# Patient Record
Sex: MALE | Race: WHITE | NOT HISPANIC OR LATINO | Employment: UNEMPLOYED | URBAN - METROPOLITAN AREA
[De-identification: names, ages, dates, MRNs, and addresses within clinical notes are randomized per-mention and may not be internally consistent; named-entity substitution may affect disease eponyms.]

---

## 2018-05-10 ENCOUNTER — TELEPHONE (OUTPATIENT)
Dept: FAMILY MEDICINE CLINIC | Facility: CLINIC | Age: 38
End: 2018-05-10

## 2018-05-10 ENCOUNTER — OFFICE VISIT (OUTPATIENT)
Dept: FAMILY MEDICINE CLINIC | Facility: CLINIC | Age: 38
End: 2018-05-10
Payer: COMMERCIAL

## 2018-05-10 VITALS
WEIGHT: 196 LBS | HEIGHT: 73 IN | HEART RATE: 70 BPM | BODY MASS INDEX: 25.98 KG/M2 | TEMPERATURE: 98 F | DIASTOLIC BLOOD PRESSURE: 70 MMHG | SYSTOLIC BLOOD PRESSURE: 112 MMHG

## 2018-05-10 DIAGNOSIS — Z00.00 ANNUAL PHYSICAL EXAM: Primary | ICD-10-CM

## 2018-05-10 DIAGNOSIS — Z13.6 SCREENING FOR CARDIOVASCULAR, RESPIRATORY, AND GENITOURINARY DISEASES: ICD-10-CM

## 2018-05-10 DIAGNOSIS — D17.1 LIPOMA OF CHEST WALL: Primary | ICD-10-CM

## 2018-05-10 DIAGNOSIS — Z13.83 SCREENING FOR CARDIOVASCULAR, RESPIRATORY, AND GENITOURINARY DISEASES: ICD-10-CM

## 2018-05-10 DIAGNOSIS — Z13.89 SCREENING FOR CARDIOVASCULAR, RESPIRATORY, AND GENITOURINARY DISEASES: ICD-10-CM

## 2018-05-10 DIAGNOSIS — L30.9 DERMATITIS: ICD-10-CM

## 2018-05-10 PROBLEM — L92.0 GRANULOMA ANNULARE: Status: ACTIVE | Noted: 2018-05-10

## 2018-05-10 PROCEDURE — 99385 PREV VISIT NEW AGE 18-39: CPT | Performed by: FAMILY MEDICINE

## 2018-05-10 NOTE — PROGRESS NOTES
Assessment/Plan:     Diagnoses and all orders for this visit:    Annual physical exam  Comments:  Normal    Dermatitis  Comments:  Patient to contact old PCP and/or dermatologist to get results of biopsy  Further recommendations based on these results  Screening for cardiovascular, respiratory, and genitourinary diseases  -     Comprehensive metabolic panel; Future  -     Lipid panel; Future          Subjective:      Patient ID: Ankur Ellis is a 40 y o  male  Here to be established and for PE  - pt with no the ongoing complaints  Patient and wife moved from [place] about a year ago  He has a persistent rash for several years on his extremities and somewhat on his trunk  Patient had a biopsy 3 years ago and was given the diagnosis  Clobetasol was prescribed  Patient states that the rash improved somewhat but did not resolve with this therapy  He has not been on anything about a year  It is occasionally pruritic but no other symptoms are noted   -patient also has a history of mild asthma as a child  He states that he has not had an issue since his childhood  Patient states that he also had allergy issues that seem to have improved as well   -patient denies any cardiovascular, respiratory, GI or  complaints  There are also no extremity complaints  -reviewed past medical, past surgical, family and social history is with patient  He is not on medications at present  The following portions of the patient's history were reviewed and updated as appropriate:   He  has a past medical history of Asthma and Multiple lipomas  He There are no active problems to display for this patient  He  has no past surgical history on file  His family history includes Hypertension in his father; Prostate cancer in his father and paternal grandfather; Thyroid cancer in his mother  He  reports that he quit smoking about 9 years ago  He has a 4 50 pack-year smoking history   He quit smokeless tobacco use about 9 years ago  He reports that he drinks about 16 8 oz of alcohol per week   He reports that he does not use drugs  No current outpatient prescriptions on file  No current facility-administered medications for this visit  He has No Known Allergies       Review of Systems   Constitutional: Negative  HENT: Negative  Eyes: Negative  Respiratory: Negative  Cardiovascular: Negative  Gastrointestinal: Negative  Endocrine: Negative  Genitourinary: Negative  Musculoskeletal: Negative  Skin: Positive for rash  Negative for color change, pallor and wound  Allergic/Immunologic: Negative  Neurological: Negative  Hematological: Negative  Psychiatric/Behavioral: Negative  Objective:      /70   Pulse 70   Temp 98 °F (36 7 °C)   Ht 6' 1" (1 854 m)   Wt 88 9 kg (196 lb)   BMI 25 86 kg/m²          Physical Exam   Constitutional: He is oriented to person, place, and time  He appears well-developed and well-nourished  HENT:   Head: Normocephalic and atraumatic  Right Ear: External ear normal    Left Ear: External ear normal    Nose: Nose normal    Mouth/Throat: Oropharynx is clear and moist    Eyes: Conjunctivae and EOM are normal  Pupils are equal, round, and reactive to light  Neck: Normal range of motion  Neck supple  No thyromegaly present  Cardiovascular: Normal rate, regular rhythm, normal heart sounds and intact distal pulses  No murmur heard  Pulmonary/Chest: Effort normal and breath sounds normal    Abdominal: Soft  Bowel sounds are normal  He exhibits no distension and no mass  There is no tenderness  Musculoskeletal: Normal range of motion  He exhibits no edema or tenderness  Lymphadenopathy:     He has no cervical adenopathy  Neurological: He is alert and oriented to person, place, and time  No cranial nerve deficit  Coordination normal    Skin: Skin is warm   Rash (Diffuse, macular low papular eruption on upper and lower extremities and a few areas of the trunk  No excoriations, vesicles or other findings ) noted  Psychiatric: He has a normal mood and affect   His behavior is normal  Judgment and thought content normal

## 2018-05-10 NOTE — TELEPHONE ENCOUNTER
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

## 2018-06-13 ENCOUNTER — APPOINTMENT (OUTPATIENT)
Dept: LAB | Facility: CLINIC | Age: 38
End: 2018-06-13
Payer: COMMERCIAL

## 2018-06-13 DIAGNOSIS — Z13.83 SCREENING FOR CARDIOVASCULAR, RESPIRATORY, AND GENITOURINARY DISEASES: ICD-10-CM

## 2018-06-13 DIAGNOSIS — Z13.6 SCREENING FOR CARDIOVASCULAR, RESPIRATORY, AND GENITOURINARY DISEASES: ICD-10-CM

## 2018-06-13 DIAGNOSIS — Z13.89 SCREENING FOR CARDIOVASCULAR, RESPIRATORY, AND GENITOURINARY DISEASES: ICD-10-CM

## 2018-06-13 LAB
ALBUMIN SERPL BCP-MCNC: 3.8 G/DL (ref 3.5–5)
ALP SERPL-CCNC: 38 U/L (ref 46–116)
ALT SERPL W P-5'-P-CCNC: 24 U/L (ref 12–78)
ANION GAP SERPL CALCULATED.3IONS-SCNC: 5 MMOL/L (ref 4–13)
AST SERPL W P-5'-P-CCNC: 13 U/L (ref 5–45)
BILIRUB SERPL-MCNC: 1 MG/DL (ref 0.2–1)
BUN SERPL-MCNC: 17 MG/DL (ref 5–25)
CALCIUM SERPL-MCNC: 8.5 MG/DL (ref 8.3–10.1)
CHLORIDE SERPL-SCNC: 106 MMOL/L (ref 100–108)
CHOLEST SERPL-MCNC: 144 MG/DL (ref 50–200)
CO2 SERPL-SCNC: 26 MMOL/L (ref 21–32)
CREAT SERPL-MCNC: 0.76 MG/DL (ref 0.6–1.3)
GFR SERPL CREATININE-BSD FRML MDRD: 117 ML/MIN/1.73SQ M
GLUCOSE P FAST SERPL-MCNC: 95 MG/DL (ref 65–99)
HDLC SERPL-MCNC: 51 MG/DL (ref 40–60)
LDLC SERPL CALC-MCNC: 80 MG/DL (ref 0–100)
NONHDLC SERPL-MCNC: 93 MG/DL
POTASSIUM SERPL-SCNC: 4 MMOL/L (ref 3.5–5.3)
PROT SERPL-MCNC: 6.9 G/DL (ref 6.4–8.2)
SODIUM SERPL-SCNC: 137 MMOL/L (ref 136–145)
TRIGL SERPL-MCNC: 66 MG/DL

## 2018-06-13 PROCEDURE — 80061 LIPID PANEL: CPT

## 2018-06-13 PROCEDURE — 80053 COMPREHEN METABOLIC PANEL: CPT

## 2018-06-13 PROCEDURE — 36415 COLL VENOUS BLD VENIPUNCTURE: CPT

## 2019-01-29 DIAGNOSIS — J02.9 ACUTE PHARYNGITIS, UNSPECIFIED ETIOLOGY: Primary | ICD-10-CM

## 2019-01-29 RX ORDER — AMOXICILLIN 875 MG/1
875 TABLET, COATED ORAL 2 TIMES DAILY
Qty: 20 TABLET | Refills: 0 | Status: SHIPPED | OUTPATIENT
Start: 2019-01-29 | End: 2019-02-08

## 2019-02-20 ENCOUNTER — OFFICE VISIT (OUTPATIENT)
Dept: FAMILY MEDICINE CLINIC | Facility: CLINIC | Age: 39
End: 2019-02-20
Payer: COMMERCIAL

## 2019-02-20 VITALS
SYSTOLIC BLOOD PRESSURE: 132 MMHG | HEART RATE: 74 BPM | BODY MASS INDEX: 25.58 KG/M2 | WEIGHT: 193 LBS | HEIGHT: 73 IN | DIASTOLIC BLOOD PRESSURE: 82 MMHG | TEMPERATURE: 97.9 F

## 2019-02-20 DIAGNOSIS — J02.8 ACUTE PHARYNGITIS DUE TO OTHER SPECIFIED ORGANISMS: Primary | ICD-10-CM

## 2019-02-20 PROCEDURE — 99213 OFFICE O/P EST LOW 20 MIN: CPT | Performed by: NURSE PRACTITIONER

## 2019-02-20 PROCEDURE — 3008F BODY MASS INDEX DOCD: CPT | Performed by: NURSE PRACTITIONER

## 2019-02-20 RX ORDER — CEPHALEXIN 500 MG/1
500 CAPSULE ORAL 3 TIMES DAILY
Qty: 30 CAPSULE | Refills: 0 | Status: SHIPPED | OUTPATIENT
Start: 2019-02-20 | End: 2019-03-02

## 2019-02-20 NOTE — PROGRESS NOTES
Assessment/Plan:     Diagnoses and all orders for this visit:    Acute pharyngitis due to other specified organisms  -     cephalexin (KEFLEX) 500 mg capsule; Take 1 capsule (500 mg total) by mouth 3 (three) times a day for 10 days    Discussed with patient plan to treat with cephalexin 500 mg every eight hours for 10 days  Discussed with patient use of conservative measures: keep well hydrated, get plenty of rest, salt water gargles to easy discomfort and use of acetaminophen or NSAIDs for fever and pain control, if appropriate  Patient instructed to call if no improvement in 72 hours or symptoms worsen    Subjective:      Patient ID: Maria Isabel Jimenez is a 41 y o  male  41 y o male presenting with sore throat, post nasal drip and a productive cough for thick phelegm for the past week  He reports that the family had a cold at the end of January and were all treated with 10 days of amoxicillin  He was feeling better at the end of the antibiotic but within a few days the symptoms returned  He reports having low grade fever with chills and some muscle aches since the symptoms returned             Family History   Problem Relation Age of Onset    Thyroid cancer Mother     Prostate cancer Father     Hypertension Father     Prostate cancer Paternal Grandfather      Social History     Socioeconomic History    Marital status: /Civil Union     Spouse name: Not on file    Number of children: Not on file    Years of education: Not on file    Highest education level: Not on file   Occupational History    Not on file   Social Needs    Financial resource strain: Not on file    Food insecurity:     Worry: Not on file     Inability: Not on file    Transportation needs:     Medical: Not on file     Non-medical: Not on file   Tobacco Use    Smoking status: Former Smoker     Packs/day: 1 50     Years: 3 00     Pack years: 4 50     Last attempt to quit: 1/10/2009     Years since quitting: 10 1    Smokeless tobacco: Former User     Quit date: 1/10/2009   Substance and Sexual Activity    Alcohol use: Yes     Alcohol/week: 16 8 oz     Types: 28 Cans of beer per week    Drug use: No    Sexual activity: Yes     Partners: Female   Lifestyle    Physical activity:     Days per week: Not on file     Minutes per session: Not on file    Stress: Not on file   Relationships    Social connections:     Talks on phone: Not on file     Gets together: Not on file     Attends Buddhism service: Not on file     Active member of club or organization: Not on file     Attends meetings of clubs or organizations: Not on file     Relationship status: Not on file    Intimate partner violence:     Fear of current or ex partner: Not on file     Emotionally abused: Not on file     Physically abused: Not on file     Forced sexual activity: Not on file   Other Topics Concern    Not on file   Social History Narrative    1 child - son          Past Medical History:   Diagnosis Date    Asthma     mild as a child    Multiple lipomas      No past surgical history on file  No Known Allergies    Current Outpatient Medications:     cephalexin (KEFLEX) 500 mg capsule, Take 1 capsule (500 mg total) by mouth 3 (three) times a day for 10 days, Disp: 30 capsule, Rfl: 0      Review of Systems   Constitutional: Positive for chills, fatigue and fever  HENT: Positive for postnasal drip and sore throat  Eyes: Negative  Respiratory: Positive for cough ( thick greenish yellow)  Cardiovascular: Negative  Gastrointestinal: Negative  Musculoskeletal: Positive for myalgias  Neurological: Negative  Psychiatric/Behavioral: Negative  Objective:    /82 (BP Location: Right arm, Patient Position: Sitting, Cuff Size: Standard)   Pulse 74   Temp 97 9 °F (36 6 °C)   Ht 6' 1" (1 854 m)   Wt 87 5 kg (193 lb)   BMI 25 46 kg/m² (Reviewed)     Physical Exam   Constitutional: He is oriented to person, place, and time   Vital signs are normal  He appears well-developed and well-nourished  HENT:   Head: Normocephalic and atraumatic  Right Ear: Tympanic membrane, external ear and ear canal normal    Left Ear: Tympanic membrane, external ear and ear canal normal    Nose: Mucosal edema and rhinorrhea present  Mouth/Throat: Uvula is midline and mucous membranes are normal  Oropharyngeal exudate and posterior oropharyngeal erythema present  Tonsils are 1+ on the right  Tonsils are 1+ on the left  Eyes: Pupils are equal, round, and reactive to light  Conjunctivae and EOM are normal    Neck: Normal range of motion  Cardiovascular: Normal rate, regular rhythm and normal heart sounds  Pulmonary/Chest: Effort normal and breath sounds normal    Neurological: He is alert and oriented to person, place, and time  Skin: Skin is warm and dry  Psychiatric: He has a normal mood and affect   His behavior is normal

## 2019-10-09 ENCOUNTER — IMMUNIZATIONS (OUTPATIENT)
Dept: FAMILY MEDICINE CLINIC | Facility: CLINIC | Age: 39
End: 2019-10-09
Payer: COMMERCIAL

## 2019-10-09 DIAGNOSIS — Z23 NEED FOR INFLUENZA VACCINATION: Primary | ICD-10-CM

## 2019-10-09 PROCEDURE — 90471 IMMUNIZATION ADMIN: CPT | Performed by: FAMILY MEDICINE

## 2019-10-09 PROCEDURE — 90686 IIV4 VACC NO PRSV 0.5 ML IM: CPT | Performed by: FAMILY MEDICINE

## 2022-09-19 ENCOUNTER — OFFICE VISIT (OUTPATIENT)
Dept: FAMILY MEDICINE CLINIC | Facility: CLINIC | Age: 42
End: 2022-09-19
Payer: COMMERCIAL

## 2022-09-19 VITALS
SYSTOLIC BLOOD PRESSURE: 142 MMHG | BODY MASS INDEX: 25.55 KG/M2 | HEIGHT: 73 IN | OXYGEN SATURATION: 99 % | HEART RATE: 61 BPM | DIASTOLIC BLOOD PRESSURE: 98 MMHG | TEMPERATURE: 97.8 F | WEIGHT: 192.8 LBS

## 2022-09-19 DIAGNOSIS — Z80.42 FAMILY HISTORY OF PROSTATE CANCER: ICD-10-CM

## 2022-09-19 DIAGNOSIS — Z12.5 SCREENING FOR PROSTATE CANCER: ICD-10-CM

## 2022-09-19 DIAGNOSIS — N50.819 PAIN IN TESTICLE, UNSPECIFIED LATERALITY: ICD-10-CM

## 2022-09-19 DIAGNOSIS — Z00.00 ANNUAL PHYSICAL EXAM: Primary | ICD-10-CM

## 2022-09-19 PROCEDURE — 3725F SCREEN DEPRESSION PERFORMED: CPT | Performed by: NURSE PRACTITIONER

## 2022-09-19 PROCEDURE — 99396 PREV VISIT EST AGE 40-64: CPT | Performed by: NURSE PRACTITIONER

## 2022-09-19 RX ORDER — FLUTICASONE PROPIONATE 50 MCG
1 SPRAY, SUSPENSION (ML) NASAL DAILY
COMMUNITY

## 2022-09-19 RX ORDER — CETIRIZINE HYDROCHLORIDE 10 MG/1
10 TABLET ORAL DAILY
COMMUNITY

## 2022-09-19 RX ORDER — DIPHENOXYLATE HYDROCHLORIDE AND ATROPINE SULFATE 2.5; .025 MG/1; MG/1
TABLET ORAL DAILY
COMMUNITY

## 2022-09-19 NOTE — PATIENT INSTRUCTIONS

## 2022-09-19 NOTE — PROGRESS NOTES
ADULT ANNUAL [address]    NAME: Anuel Bustamante  AGE: 40 y o  SEX: male  : 1980     DATE: 2022     Assessment and Plan:     Problem List Items Addressed This Visit    None     Visit Diagnoses     Annual physical exam    -  Primary    Relevant Orders    Comprehensive metabolic panel    CBC and differential    Lipid panel    Pain in testicle, unspecified laterality        Relevant Orders    Ambulatory Referral to Urology    Screening for prostate cancer        Relevant Orders    PSA, Total Screen    Family history of prostate cancer        Relevant Orders    Ambulatory Referral to Urology        Immunizations and preventive care screenings were discussed with patient today  Appropriate education was printed on patient's after visit summary  Counseling:  Dental Health: discussed importance of regular tooth brushing, flossing, and dental visits  Exercise: the importance of regular exercise/physical activity was discussed  Recommend exercise 3-5 times per week for at least 30 minutes  BMI Counseling: Body mass index is 25 44 kg/m²  The BMI is above normal  Nutrition recommendations include decreasing portion sizes, encouraging healthy choices of fruits and vegetables, consuming healthier snacks, moderation in carbohydrate intake and increasing intake of lean protein  Exercise recommendations include exercising 3-5 times per week and strength training exercises  Rationale for BMI follow-up plan is due to patient being overweight or obese  Depression Screening and Follow-up Plan: Patient was screened for depression during today's encounter  They screened negative with a PHQ-2 score of 0  Return in about 1 year (around 2023)       Chief Complaint:     Chief Complaint   Patient presents with    Physical Exam    lumps on back    joint pain       History of Present Illness:     Adult Annual Physical   Patient here for a comprehensive physical exam  The patient reports that he hurt his back and pulled a groin muscle last week  He has been going to a chiropractor for the back and is starting to feel better but the groin intermittently hurts at times  He has a family of prostate cancer and was told years ago he had some plaque around one of his testicle  He has been having increased discomfort in his left testicle so would like a refer to urology to have it evaluated  Diet and Physical Activity  Diet/Nutrition: well balanced diet and consuming 3-5 servings of fruits/vegetables daily  Exercise: no formal exercise  Depression Screening  PHQ-2/9 Depression Screening    Little interest or pleasure in doing things: 0 - not at all  Feeling down, depressed, or hopeless: 0 - not at all  PHQ-2 Score: 0  PHQ-2 Interpretation: Negative depression screen       General Health  Sleep: sleeps well and gets 4-6 hours of sleep on average  Hearing: normal - bilateral   Vision: no vision problems  Dental: regular dental visits and brushes teeth twice daily   Health  Symptoms include: urinary frequency, urinary hesitancy, incomplete bladder emptying and urinary obstruction     Review of Systems:     Review of Systems   Constitutional: Negative for activity change, appetite change and unexpected weight change  HENT: Negative for dental problem, ear pain, hearing loss, nosebleeds, sneezing, sore throat, tinnitus and trouble swallowing  Eyes: Negative for visual disturbance  Respiratory: Negative for cough, chest tightness, shortness of breath and wheezing  Cardiovascular: Negative for chest pain, palpitations and leg swelling  Gastrointestinal: Positive for abdominal pain  Negative for abdominal distention, constipation, diarrhea and nausea  Endocrine: Negative for polydipsia, polyphagia and polyuria  Genitourinary: Negative  Musculoskeletal: Positive for back pain ( seeing a chirpractor)   Negative for arthralgias, myalgias and neck pain  Skin: Negative for color change and rash  Allergic/Immunologic: Negative for environmental allergies  Neurological: Negative for dizziness, weakness, light-headedness and headaches  Psychiatric/Behavioral: Negative  Negative for dysphoric mood and sleep disturbance  The patient is not nervous/anxious  Past Medical History:     Past Medical History:   Diagnosis Date    Asthma     mild as a child    Multiple lipomas       Past Surgical History:     History reviewed  No pertinent surgical history  Family History:     Family History   Problem Relation Age of Onset    Thyroid cancer Mother     Prostate cancer Father     Hypertension Father     Prostate cancer Paternal Grandfather       Social History:     Social History     Socioeconomic History    Marital status: /Civil Union     Spouse name: None    Number of children: None    Years of education: None    Highest education level: None   Occupational History    None   Tobacco Use    Smoking status: Former Smoker     Packs/day: 1 50     Years: 3 00     Pack years: 4 50     Quit date: 1/10/2009     Years since quittin 6    Smokeless tobacco: Former User     Quit date: 1/10/2009   Substance and Sexual Activity    Alcohol use:  Yes     Alcohol/week: 28 0 standard drinks     Types: 28 Cans of beer per week    Drug use: No    Sexual activity: Yes     Partners: Female   Other Topics Concern    None   Social History Narrative    1 child - son          Social Determinants of Health     Financial Resource Strain: Not on file   Food Insecurity: Not on file   Transportation Needs: Not on file   Physical Activity: Not on file   Stress: Not on file   Social Connections: Not on file   Intimate Partner Violence: Not on file   Housing Stability: Not on file      Current Medications:     Current Outpatient Medications   Medication Sig Dispense Refill    cetirizine (ZyrTEC) 10 mg tablet Take 10 mg by mouth daily      fluticasone (FLONASE) 50 mcg/act nasal spray 1 spray into each nostril daily      multivitamin (THERAGRAN) TABS Take by mouth daily       No current facility-administered medications for this visit  Allergies: Allergies   Allergen Reactions    Bee Venom Edema      Physical Exam:     /98   Pulse 61   Temp 97 8 °F (36 6 °C)   Ht 6' 1" (1 854 m)   Wt 87 5 kg (192 lb 12 8 oz)   SpO2 99%   BMI 25 44 kg/m² (Reviewed)    Physical Exam  Vitals reviewed  Constitutional:       General: He is not in acute distress  Appearance: Normal appearance  He is well-developed, well-groomed and normal weight  He is not ill-appearing  HENT:      Head: Normocephalic and atraumatic  Right Ear: Tympanic membrane, ear canal and external ear normal       Left Ear: Tympanic membrane, ear canal and external ear normal       Nose: Nose normal       Mouth/Throat:      Lips: Pink  Mouth: Mucous membranes are moist       Dentition: Normal dentition  Pharynx: Oropharynx is clear  Eyes:      General: Lids are normal       Extraocular Movements: Extraocular movements intact  Conjunctiva/sclera: Conjunctivae normal       Pupils: Pupils are equal, round, and reactive to light  Neck:      Thyroid: No thyromegaly or thyroid tenderness  Trachea: Trachea and phonation normal    Cardiovascular:      Rate and Rhythm: Normal rate and regular rhythm  Pulses:           Radial pulses are 2+ on the right side and 2+ on the left side  Dorsalis pedis pulses are 2+ on the right side and 2+ on the left side  Posterior tibial pulses are 2+ on the right side and 2+ on the left side  Heart sounds: Normal heart sounds  No murmur heard  No friction rub  No gallop  Pulmonary:      Effort: Pulmonary effort is normal       Breath sounds: Normal breath sounds  Abdominal:      General: Bowel sounds are normal  There is no distension  Palpations: Abdomen is soft  Tenderness: There is abdominal tenderness in the right lower quadrant  There is no guarding or rebound  Musculoskeletal:         General: Normal range of motion  Cervical back: Full passive range of motion without pain and neck supple  Right lower leg: No edema  Left lower leg: No edema  Skin:     General: Skin is warm and dry  Capillary Refill: Capillary refill takes less than 2 seconds  Neurological:      Mental Status: He is alert and oriented to person, place, and time  Cranial Nerves: Cranial nerves are intact  Sensory: Sensation is intact  Motor: Motor function is intact  Coordination: Coordination is intact  Deep Tendon Reflexes: Reflexes are normal and symmetric  Psychiatric:         Mood and Affect: Mood normal          Speech: Speech normal          Behavior: Behavior normal  Behavior is cooperative            Albert

## 2022-09-26 ENCOUNTER — APPOINTMENT (OUTPATIENT)
Dept: LAB | Facility: CLINIC | Age: 42
End: 2022-09-26
Payer: COMMERCIAL

## 2022-09-26 DIAGNOSIS — Z00.00 ANNUAL PHYSICAL EXAM: ICD-10-CM

## 2022-09-26 DIAGNOSIS — R79.89 ELEVATED LIVER FUNCTION TESTS: Primary | ICD-10-CM

## 2022-09-26 DIAGNOSIS — Z12.5 SCREENING FOR PROSTATE CANCER: ICD-10-CM

## 2022-09-26 LAB
ALBUMIN SERPL BCP-MCNC: 3.6 G/DL (ref 3.5–5)
ALP SERPL-CCNC: 44 U/L (ref 46–116)
ALT SERPL W P-5'-P-CCNC: 27 U/L (ref 12–78)
ANION GAP SERPL CALCULATED.3IONS-SCNC: 3 MMOL/L (ref 4–13)
AST SERPL W P-5'-P-CCNC: 11 U/L (ref 5–45)
BASOPHILS # BLD AUTO: 0.09 THOUSANDS/ΜL (ref 0–0.1)
BASOPHILS NFR BLD AUTO: 2 % (ref 0–1)
BILIRUB SERPL-MCNC: 1.44 MG/DL (ref 0.2–1)
BUN SERPL-MCNC: 12 MG/DL (ref 5–25)
CALCIUM SERPL-MCNC: 9 MG/DL (ref 8.3–10.1)
CHLORIDE SERPL-SCNC: 106 MMOL/L (ref 96–108)
CHOLEST SERPL-MCNC: 205 MG/DL
CO2 SERPL-SCNC: 28 MMOL/L (ref 21–32)
CREAT SERPL-MCNC: 1.06 MG/DL (ref 0.6–1.3)
EOSINOPHIL # BLD AUTO: 0.23 THOUSAND/ΜL (ref 0–0.61)
EOSINOPHIL NFR BLD AUTO: 5 % (ref 0–6)
ERYTHROCYTE [DISTWIDTH] IN BLOOD BY AUTOMATED COUNT: 12.8 % (ref 11.6–15.1)
GFR SERPL CREATININE-BSD FRML MDRD: 86 ML/MIN/1.73SQ M
GLUCOSE P FAST SERPL-MCNC: 106 MG/DL (ref 65–99)
HCT VFR BLD AUTO: 45.1 % (ref 36.5–49.3)
HDLC SERPL-MCNC: 57 MG/DL
HGB BLD-MCNC: 14.8 G/DL (ref 12–17)
IMM GRANULOCYTES # BLD AUTO: 0.01 THOUSAND/UL (ref 0–0.2)
IMM GRANULOCYTES NFR BLD AUTO: 0 % (ref 0–2)
LDLC SERPL CALC-MCNC: 133 MG/DL (ref 0–100)
LYMPHOCYTES # BLD AUTO: 1.72 THOUSANDS/ΜL (ref 0.6–4.47)
LYMPHOCYTES NFR BLD AUTO: 36 % (ref 14–44)
MCH RBC QN AUTO: 28.8 PG (ref 26.8–34.3)
MCHC RBC AUTO-ENTMCNC: 32.8 G/DL (ref 31.4–37.4)
MCV RBC AUTO: 88 FL (ref 82–98)
MONOCYTES # BLD AUTO: 0.51 THOUSAND/ΜL (ref 0.17–1.22)
MONOCYTES NFR BLD AUTO: 11 % (ref 4–12)
NEUTROPHILS # BLD AUTO: 2.19 THOUSANDS/ΜL (ref 1.85–7.62)
NEUTS SEG NFR BLD AUTO: 46 % (ref 43–75)
NONHDLC SERPL-MCNC: 148 MG/DL
NRBC BLD AUTO-RTO: 0 /100 WBCS
PLATELET # BLD AUTO: 204 THOUSANDS/UL (ref 149–390)
PMV BLD AUTO: 10.4 FL (ref 8.9–12.7)
POTASSIUM SERPL-SCNC: 4.2 MMOL/L (ref 3.5–5.3)
PROT SERPL-MCNC: 7.3 G/DL (ref 6.4–8.4)
PSA SERPL-MCNC: 0.5 NG/ML (ref 0–4)
RBC # BLD AUTO: 5.13 MILLION/UL (ref 3.88–5.62)
SODIUM SERPL-SCNC: 137 MMOL/L (ref 135–147)
TRIGL SERPL-MCNC: 73 MG/DL
WBC # BLD AUTO: 4.75 THOUSAND/UL (ref 4.31–10.16)

## 2022-09-26 PROCEDURE — 85025 COMPLETE CBC W/AUTO DIFF WBC: CPT

## 2022-09-26 PROCEDURE — 80061 LIPID PANEL: CPT

## 2022-09-26 PROCEDURE — 80053 COMPREHEN METABOLIC PANEL: CPT

## 2022-09-26 PROCEDURE — G0103 PSA SCREENING: HCPCS

## 2022-09-26 PROCEDURE — 36415 COLL VENOUS BLD VENIPUNCTURE: CPT

## 2023-08-31 ENCOUNTER — TELEPHONE (OUTPATIENT)
Dept: FAMILY MEDICINE CLINIC | Facility: CLINIC | Age: 43
End: 2023-08-31

## 2023-08-31 NOTE — TELEPHONE ENCOUNTER
Patient left a message via [employer]     Left message for a return call. We have no record of tetanus. Clotilde, my name is Abelino Knutson, current patient of Doctor Matias Wright. YOB: 1980. I'm trying to look up my medical records to see when my last tetanus shot was. I got a cut on my finger from like a  metal pole and I just wanted to check to see if that was up to date and whether I needed to update that or not. My number is [phone]. That hopefully is in your records somewhere. Again, my name is Didier Reed. My birthday is May 14th, 1980 and the number is [phone]. And I'm just calling to check on my the date of my last shot. Thank you.  Phuong.

## 2023-08-31 NOTE — PROGRESS NOTES
PT Evaluation     Today's date: 2023  Patient name: Ro Vargas  : 1980  MRN: [id number]  Referring provider: Savannah Rivera*  Dx:   Encounter Diagnosis     ICD-10-CM    1. Right elbow pain  M25.521       2. Chronic right shoulder pain  M25.511     G89.29                      Assessment  Assessment details: Ro Vargas is a 44 y.o. male who presents with signs and symptoms consistent of right elbow medial epicondylagia and right subacromial shoulder pain. Pt presents with acute right medial elbow discomfort that is overuse in nature from yard work. Pt also has a chronic history of right shoulder pain which seems associated with GIRD and RTC irritation. Patient presents with right elbow stiffness, elbow joint mobility limitations, shoulder loss of IR, and decreased muscle performance of the RTC. Pt does present with positive impingement signs and labral tests. Due to these impairments, Patient has difficulty performing repetitive lifting, over head activities, and work related activities. Patient would benefit from skilled physical therapy to address the impairments, improve their level of function, and to improve their overall quality of life. Primary movement impairments:   1) Elbow stiffness: pronation and flexion  2) Elbow joint mobility restrictions  3) GIRD  4) Weakness of RTC musculature  Impairments: abnormal muscle firing, abnormal or restricted ROM, activity intolerance, impaired physical strength, lacks appropriate home exercise program, pain with function and scapular dyskinesis  Understanding of Dx/Px/POC: good   Prognosis: good    Goals  Short Term Goals: to be achieved by 4 weeks  1) Patient to be independent with basic HEP. 2) Decrease pain to 3/10 at its worst.  3) Increase right elbow ROM to full with pronation and flexion  4) Increase shoulder strength by 1/2 MMT grade in all deficient planes.     Long Term Goals: to be achieved by discharge  1) FOTO equal to or greater than expecged. 2) Patient to be independent with comprehensive HEP. 3) Patient will demonstrate maximal over head reaching  4) Increase UE strength to 5/5 MMT grade in all planes to improve a/iadls. Plan  Patient would benefit from: PT eval and skilled physical therapy  Planned modality interventions: low level laser therapy  Planned therapy interventions: joint mobilization, manual therapy, therapeutic activities, therapeutic exercise and home exercise program  Frequency: 1-2x per week for 4 weeks. Treatment plan discussed with: patient        Subjective Evaluation    History of Present Illness  Mechanism of injury: History of Current Injury: R elbow: Pt reports 1 month history of right elbow pain. Pt notes that he was doing a lot of yard work recently. He reports stiffness in rotating his elbow. Pt did a lot of shoveling and sifting recently. Pt denies any swelling. Pt denies any locking with twisting. Pt denies any N&T in the RUE at this time. Right shoulder: Pt notes right shoulder pain since high school when he was swimming. Pt has avoided exercising and swimming due to his right shoulder. He would like to return to exercise. Pain location/Descriptors: P1: R medial and lateral elbow pain. P2: Dull anterior and posterior right shoulder pain which can be sharp posteriorly depending on activity. Aggravating factors: P1: Lifting, grabbing, rotating, WB on hands. P2: Over head activities, swimming. Easing factors: Tylenol  24 HR pattern: Depending on activity. Imaging: None recently  Special Questions: Denies current N&T, Denies loss of  strength  Patient goals:  Learn exercises to fix it and learn movements to stay away from. Hobbies/Interest: Trying to fix his garage. Swimming. Run from time to time. Occupation: . Some lifting but nothing overhead. Pain  Pain scale: 0-2.   Pain scale at highest: Right elbow: 8/10 (transient)/ R shoulder: 5-6 (annoying) Objective     Tenderness     Right Elbow   Tenderness in the distal triceps tendon. No tenderness in the distal biceps tendon, lateral epicondyle and proximal radioulnar joint. Right Wrist/Hand   Tenderness in the distal triceps tendon. No tenderness in the distal biceps tendon, lateral epicondyle and proximal radioulnar joint. Active Range of Motion   Left Shoulder   External rotation 90°: 100 degrees   Internal rotation 90°: 75 degrees   Internal rotation BTB: T7     Right Shoulder   Flexion: Right shoulder active forward flexion: WNL. Abduction: Right shoulder active abduction: WNL. External rotation 90°: 105 degrees  Internal rotation 0°: 60 degrees   Internal rotation BTB: L5     Right Elbow   Flexion: Right elbow active flexion: WNL pain at end range    Extension: Right elbow active extension: WNL pain at end range. Forearm supination: Right elbow active supination: WNL. Forearm pronation: Right elbow active pronation: Mild limitation with pain. Joint Play     Right Elbow   Hypomobile in the humeroulnar joint, humeroradial joint and proximal radioulnar joint. Strength/Myotome Testing     Left Shoulder   Normal muscle strength    Right Shoulder     Planes of Motion   Flexion: 4+   Abduction: 4+   External rotation at 0°: 4 (Pain)   External rotation at 90°: 4   Internal rotation at 0°: 5   Internal rotation at 90°: 4+     Tests     Right Shoulder   Positive active compression (Routt), empty can and passive horizontal adduction. Negative external rotation lag sign, Neer's and painful arc. Right Elbow   Positive active medial epicondylitis, passive medial epicondylitis, radial tunnel and reverse Cozen's test positive. Negative Cozen's, Maudsley's, milking maneuver, Mill's, moving valgus stress, valgus stress at 0 degrees, valgus stress at 30 degrees, varus stress at 0 degrees and varus stress at 30 degrees.      Additional Tests Details   strength:   105# R/100# L Precautions: Standard       Manuals             R elbow ROM (flexion/pronation)              Ulnohumeral, radiohumeral, radioulnar mobilizations                                       Neuro Re-Ed             TB Ws             TB No moneys             Flexbar pronation             Flexbar supination                                                    Ther Ex             Supination/pronation with weight              UBE             Sleeper stretch              Wrist flexor stretch WB             Wrist extension stretch  WB             Wrist extension PRE             Wrist flexion PRE                          Ther Activity                                       Gait Training                                       Modalities                                        Pt was given a HEP with verbal and written instruction x 10 minutes:   Sleeper stretch, Supination/pronation, Scap retraction with ER

## 2023-09-01 ENCOUNTER — OFFICE VISIT (OUTPATIENT)
Dept: PHYSICAL THERAPY | Facility: CLINIC | Age: 43
End: 2023-09-01
Payer: COMMERCIAL

## 2023-09-01 ENCOUNTER — CLINICAL SUPPORT (OUTPATIENT)
Dept: FAMILY MEDICINE CLINIC | Facility: CLINIC | Age: 43
End: 2023-09-01
Payer: COMMERCIAL

## 2023-09-01 DIAGNOSIS — M25.521 RIGHT ELBOW PAIN: Primary | ICD-10-CM

## 2023-09-01 DIAGNOSIS — M25.511 CHRONIC RIGHT SHOULDER PAIN: ICD-10-CM

## 2023-09-01 DIAGNOSIS — Z23 NEED FOR VACCINATION: Primary | ICD-10-CM

## 2023-09-01 DIAGNOSIS — G89.29 CHRONIC RIGHT SHOULDER PAIN: ICD-10-CM

## 2023-09-01 PROCEDURE — 97110 THERAPEUTIC EXERCISES: CPT | Performed by: PHYSICAL THERAPIST

## 2023-09-01 PROCEDURE — 97162 PT EVAL MOD COMPLEX 30 MIN: CPT | Performed by: PHYSICAL THERAPIST

## 2023-09-01 PROCEDURE — 90471 IMMUNIZATION ADMIN: CPT | Performed by: FAMILY MEDICINE

## 2023-09-01 PROCEDURE — 90715 TDAP VACCINE 7 YRS/> IM: CPT | Performed by: FAMILY MEDICINE

## 2023-09-08 ENCOUNTER — OFFICE VISIT (OUTPATIENT)
Dept: PHYSICAL THERAPY | Facility: CLINIC | Age: 43
End: 2023-09-08
Payer: COMMERCIAL

## 2023-09-08 DIAGNOSIS — G89.29 CHRONIC RIGHT SHOULDER PAIN: ICD-10-CM

## 2023-09-08 DIAGNOSIS — M25.511 CHRONIC RIGHT SHOULDER PAIN: ICD-10-CM

## 2023-09-08 DIAGNOSIS — M25.521 RIGHT ELBOW PAIN: Primary | ICD-10-CM

## 2023-09-08 PROCEDURE — 97112 NEUROMUSCULAR REEDUCATION: CPT | Performed by: PHYSICAL THERAPIST

## 2023-09-08 PROCEDURE — 97140 MANUAL THERAPY 1/> REGIONS: CPT | Performed by: PHYSICAL THERAPIST

## 2023-09-08 PROCEDURE — 97110 THERAPEUTIC EXERCISES: CPT | Performed by: PHYSICAL THERAPIST

## 2023-09-08 NOTE — PROGRESS NOTES
Daily Note     Today's date: 2023  Patient name: Aaron Layne  : 1980  MRN: [id number]  Referring provider: Yong Adair*  Dx:   Encounter Diagnosis     ICD-10-CM    1. Right elbow pain  M25.521       2. Chronic right shoulder pain  M25.511     G89.29                      Subjective: Pt reports compliance with HEP. He states that he feels mild discomfort from performing the home program since it is targeting that area. Objective: See treatment diary below      Assessment: Initiated treatment focusing on primary movement impairments identified during the initial evaluation. Tolerated treatment well. Left elbow ROM improved with mobilizations and stretching. Improved IR of shoulder at 90 to nearly 85 degrees (65 at IE) Patient would benefit from continued PT. Plan: Continue per plan of care.       Precautions: Standard       Manuals             R elbow ROM (flexion/pronation)  C/ supination/pronation x 5'            Ulnohumeral, radiohumeral, radioulnar mobilizations Radial head Gr III            R shoulder IR mobilizations Gr III                          Neuro Re-Ed             TB Ws 2x10 ytb             TB No moneys             Flexbar pronation 2x10 green            Flexbar supination 2x10 green            TB IR/ER 30x rtb                                       Ther Ex             Supination/pronation with weight              UBE 3'/3'            Sleeper stretch              Wrist flexor stretch WB             Wrist extension stretch  WB             Wrist extension PRE 2x10 5#            Wrist flexion PRE 2x10 5#                         Ther Activity                                       Gait Training                                       Modalities                                       1:1 with PT from [id number]  Updated HEP

## 2023-09-12 ENCOUNTER — OFFICE VISIT (OUTPATIENT)
Dept: PHYSICAL THERAPY | Facility: CLINIC | Age: 43
End: 2023-09-12
Payer: COMMERCIAL

## 2023-09-12 DIAGNOSIS — G89.29 CHRONIC RIGHT SHOULDER PAIN: ICD-10-CM

## 2023-09-12 DIAGNOSIS — M25.521 RIGHT ELBOW PAIN: Primary | ICD-10-CM

## 2023-09-12 DIAGNOSIS — M25.511 CHRONIC RIGHT SHOULDER PAIN: ICD-10-CM

## 2023-09-12 PROCEDURE — 97110 THERAPEUTIC EXERCISES: CPT | Performed by: PHYSICAL THERAPIST

## 2023-09-12 PROCEDURE — 97112 NEUROMUSCULAR REEDUCATION: CPT | Performed by: PHYSICAL THERAPIST

## 2023-09-12 PROCEDURE — 97140 MANUAL THERAPY 1/> REGIONS: CPT | Performed by: PHYSICAL THERAPIST

## 2023-09-12 NOTE — PROGRESS NOTES
Daily Note     Today's date: 2023  Patient name: Minh Arcos  : 1980  MRN: [id number]  Referring provider: Trevin Whittaker  Dx:   Encounter Diagnosis     ICD-10-CM    1. Right elbow pain  M25.521       2. Chronic right shoulder pain  M25.511     G89.29           Start Time: 1525  Stop Time: 1610  Total time in clinic (min): 45 minutes    Subjective: Pt reports mild soreness after last session, but overall doing fine. Pt continues to work on his [employer] project. Objective: See treatment diary below      Assessment: Tolerated treatment well. Implemented prone scapular stabilization exercises. Significant improvement with IR at 90 of abduction. Patient exhibited good technique with therapeutic exercises and would benefit from continued PT. Plan: Continue per plan of care.       Precautions: Standard       Manuals            R elbow ROM (flexion/pronation)  C/ supination/pronation x 5' C/ supination/pronation x 5           Ulnohumeral, radiohumeral, radioulnar mobilizations Radial head Gr III Radial head Gr III           R shoulder IR mobilizations Gr III  Gr III                         Neuro Re-Ed             TB Ws 2x10 ytb             TB No moneys             Flexbar pronation 2x10 green 2x10 green           Flexbar supination 2x10 green 2x10 green           TB IR/ER 30x rtb             Prone Ts  2x10 3#           Prone Ws  2x10 3#           PNF ER c/ TB  2x10 rtb            Ther Ex             Supination/pronation with weight              UBE 3'/3' 3'/3'           Sleeper stretch              Wrist flexor stretch WB             Wrist extension stretch  WB             Wrist extension PRE 2x10 5# 2x10 5#           Wrist flexion PRE 2x10 5# 2x10 5#                        Ther Activity                                       Gait Training                                       Modalities                                       1:1 with PT from [id number]

## 2023-09-19 ENCOUNTER — OFFICE VISIT (OUTPATIENT)
Dept: PHYSICAL THERAPY | Facility: CLINIC | Age: 43
End: 2023-09-19
Payer: COMMERCIAL

## 2023-09-19 DIAGNOSIS — G89.29 CHRONIC RIGHT SHOULDER PAIN: ICD-10-CM

## 2023-09-19 DIAGNOSIS — M25.511 CHRONIC RIGHT SHOULDER PAIN: ICD-10-CM

## 2023-09-19 DIAGNOSIS — M25.521 RIGHT ELBOW PAIN: Primary | ICD-10-CM

## 2023-09-19 PROCEDURE — 97112 NEUROMUSCULAR REEDUCATION: CPT | Performed by: PHYSICAL THERAPIST

## 2023-09-19 PROCEDURE — 97140 MANUAL THERAPY 1/> REGIONS: CPT | Performed by: PHYSICAL THERAPIST

## 2023-09-19 PROCEDURE — 97110 THERAPEUTIC EXERCISES: CPT | Performed by: PHYSICAL THERAPIST

## 2023-09-19 NOTE — PROGRESS NOTES
Daily Note     Today's date: 2023  Patient name: Karely Sue  : 1980  MRN: [id number]  Referring provider: Elly Christy*  Dx:   Encounter Diagnosis     ICD-10-CM    1. Right elbow pain  M25.521       2. Chronic right shoulder pain  M25.511     G89.29           Start Time: 1630  Stop Time: 1718  Total time in clinic (min): 48 minutes    Subjective: Pt reports improving elbow discomfort. He notes more posterolateral shoulder pain. However, pt does state that his pain is ebbing and flowing as his symptoms were worse over the weekend. Objective: See treatment diary below      Assessment: Tolerated treatment well. Elbow and shoulder mobility improving in all planes. Minimal discomfort present in elbow throughout plan. Shoulder seems to be more limiting at this time. Pt experienced symptom at anterior and posterolateral right shoulder. Implemented more shoulder stabilization exercises and serratus exercises. Patient exhibited good technique with therapeutic exercises and would benefit from continued PT. Plan: Continue per plan of care.       Precautions: Standard       Manuals           R elbow ROM (flexion/pronation)  C/ supination/pronation x 5' C/ supination/pronation x 5 C/ supination/pronation x 5          Ulnohumeral, radiohumeral, radioulnar mobilizations Radial head Gr III Radial head Gr III Radial head Gr III          R shoulder IR mobilizations Gr III  Gr III  Not performed                        Neuro Re-Ed             TB Ws 2x10 ytb   10x with press           TB No moneys             Flexbar pronation 2x10 green 2x10 green 2x10 green          Flexbar supination 2x10 green 2x10 green 2x10 green          TB IR/ER 30x rtb             Prone Ts  2x10 3#           Prone Ws  2x10 3#           PNF ER c/ TB  2x10 rtb            Serratus press with circles   3x10 cw/ccw          Ther Ex             Supination/pronation with weight              UBE 3'/3' 3'/3' 3'/3' Sleeper stretch              Wrist flexor stretch WB             Wrist extension stretch  WB             Wrist extension PRE 2x10 5# 2x10 5#           Wrist flexion PRE 2x10 5# 2x10 5#           Biceps curl with supination   3x10 10#          Ther Activity             WB shoulder taps on plinth   2x20                       Gait Training                                       Modalities                                       1:1 with PT from [id number]

## 2023-09-26 ENCOUNTER — OFFICE VISIT (OUTPATIENT)
Dept: FAMILY MEDICINE CLINIC | Facility: CLINIC | Age: 43
End: 2023-09-26
Payer: COMMERCIAL

## 2023-09-26 ENCOUNTER — APPOINTMENT (OUTPATIENT)
Dept: PHYSICAL THERAPY | Facility: CLINIC | Age: 43
End: 2023-09-26
Payer: COMMERCIAL

## 2023-09-26 VITALS
TEMPERATURE: 97.5 F | HEART RATE: 55 BPM | BODY MASS INDEX: 25.31 KG/M2 | OXYGEN SATURATION: 99 % | SYSTOLIC BLOOD PRESSURE: 128 MMHG | WEIGHT: 191 LBS | DIASTOLIC BLOOD PRESSURE: 88 MMHG | HEIGHT: 73 IN

## 2023-09-26 DIAGNOSIS — Z01.10 ENCOUNTER FOR HEARING EXAMINATION, UNSPECIFIED WHETHER ABNORMAL FINDINGS: ICD-10-CM

## 2023-09-26 DIAGNOSIS — Z13.83 SCREENING FOR CARDIOVASCULAR, RESPIRATORY, AND GENITOURINARY DISEASES: ICD-10-CM

## 2023-09-26 DIAGNOSIS — Z13.6 SCREENING FOR CARDIOVASCULAR, RESPIRATORY, AND GENITOURINARY DISEASES: ICD-10-CM

## 2023-09-26 DIAGNOSIS — Z12.5 SCREENING FOR PROSTATE CANCER: ICD-10-CM

## 2023-09-26 DIAGNOSIS — R79.89 ELEVATED LIVER FUNCTION TESTS: ICD-10-CM

## 2023-09-26 DIAGNOSIS — M25.511 ACUTE PAIN OF RIGHT SHOULDER: ICD-10-CM

## 2023-09-26 DIAGNOSIS — Z00.00 ANNUAL PHYSICAL EXAM: Primary | ICD-10-CM

## 2023-09-26 DIAGNOSIS — M25.521 RIGHT ELBOW PAIN: ICD-10-CM

## 2023-09-26 DIAGNOSIS — Z80.42 FAMILY HISTORY OF PROSTATE CANCER: ICD-10-CM

## 2023-09-26 DIAGNOSIS — Z23 NEED FOR VACCINATION: ICD-10-CM

## 2023-09-26 DIAGNOSIS — Z13.89 SCREENING FOR CARDIOVASCULAR, RESPIRATORY, AND GENITOURINARY DISEASES: ICD-10-CM

## 2023-09-26 PROCEDURE — 99214 OFFICE O/P EST MOD 30 MIN: CPT | Performed by: FAMILY MEDICINE

## 2023-09-26 PROCEDURE — 90686 IIV4 VACC NO PRSV 0.5 ML IM: CPT | Performed by: FAMILY MEDICINE

## 2023-09-26 PROCEDURE — 90471 IMMUNIZATION ADMIN: CPT | Performed by: FAMILY MEDICINE

## 2023-09-26 PROCEDURE — 99396 PREV VISIT EST AGE 40-64: CPT | Performed by: FAMILY MEDICINE

## 2023-09-26 NOTE — PROGRESS NOTES
ADULT ANNUAL [facility]    NAME: Padmini Pettit  AGE: 44 y.o. SEX: male  : 1980     DATE: 2023     Assessment and Plan:     Problem List Items Addressed This Visit        Other    Acute pain of right shoulder    Relevant Orders    Ambulatory Referral to Physical Therapy    Right elbow pain    Relevant Orders    Ambulatory Referral to Physical Therapy   Other Visit Diagnoses     Annual physical exam    -  Primary    Elevated liver function tests        Relevant Orders    CBC and differential    Comprehensive metabolic panel    Need for vaccination        Relevant Orders    influenza vaccine, quadrivalent, 0.5 mL, preservative-free, for adult and pediatric patients 6 mos+ (AFLURIA, FLUARIX, FLULAVAL, FLUZONE) (Completed)    Encounter for hearing examination, unspecified whether abnormal findings        Relevant Orders    Ambulatory Referral to Audiology    Screening for prostate cancer        Relevant Orders    PSA, Total Screen    Family history of prostate cancer        Relevant Orders    PSA, Total Screen    Screening for cardiovascular, respiratory, and genitourinary diseases        Relevant Orders    CBC and differential    Comprehensive metabolic panel    Lipid panel          Immunizations and preventive care screenings were discussed with patient today. Appropriate education was printed on patient's after visit summary. Counseling:  Exercise: the importance of regular exercise/physical activity was discussed. Recommend exercise 3-5 times per week for at least 30 minutes. Return in about 1 year (around 2024). Chief Complaint:     Chief Complaint   Patient presents with   • Physical Exam      History of Present Illness:     Adult Annual Physical   Patient here for a comprehensive physical exam. The patient reports problems - as below.   - pt has been experiencing some R should and R elbow pain - ?over use related. No trauma noted. - pt due for labs  - pt worried about hearing. Would like formal eval    Diet and Physical Activity  Diet/Nutrition: well balanced diet. Exercise: no formal exercise. Depression Screening  PHQ-2/9 Depression Screening    Little interest or pleasure in doing things: 0 - not at all  Feeling down, depressed, or hopeless: 0 - not at all  PHQ-2 Score: 0  PHQ-2 Interpretation: Negative depression screen       General Health  Sleep: inconsistent due to work schedule. Hearing: normal - bilateral.  Would like a referral to audiology  Vision: no vision problems. Dental: regular dental visits and brushes teeth twice daily.  Health  Symptoms include: none     Review of Systems:     Review of Systems   Constitutional: Negative. HENT: Positive for hearing loss (?hearing change?). Negative for congestion, ear discharge, ear pain, postnasal drip, sinus pressure, sinus pain, sore throat and voice change. Eyes: Negative. Respiratory: Negative. Cardiovascular: Negative. Gastrointestinal: Negative. Endocrine: Negative. Genitourinary: Negative. Musculoskeletal: Positive for arthralgias (right shoulder and right elbow). Negative for back pain, joint swelling and myalgias. Skin: Negative. Allergic/Immunologic: Negative. Neurological: Negative. Hematological: Negative. Psychiatric/Behavioral: Negative. Past Medical History:     Past Medical History:   Diagnosis Date   • Asthma     mild as a child   • Multiple lipomas       Past Surgical History:     History reviewed. No pertinent surgical history.    Family History:     Family History   Problem Relation Age of Onset   • Thyroid cancer Mother    • Prostate cancer Father    • Hypertension Father    • Prostate cancer Paternal Grandfather       Social History:     Social History     Socioeconomic History   • Marital status: /Civil Union     Spouse name: None   • Number of children: None   • Years of education: None   • Highest education level: None   Occupational History   • None   Tobacco Use   • Smoking status: Former     Packs/day: 1.50     Years: 3.00     Total pack years: 4.50     Types: Cigarettes     Quit date: 1/10/2009     Years since quittin.7     Passive exposure: Never   • Smokeless tobacco: Former     Quit date: 1/10/2009   Vaping Use   • Vaping Use: Never used   Substance and Sexual Activity   • Alcohol use: Yes     Alcohol/week: 28.0 standard drinks of alcohol     Types: 28 Cans of beer per week   • Drug use: No   • Sexual activity: Yes     Partners: Female   Other Topics Concern   • None   Social History Narrative    1 child - son          Social Determinants of Health     Financial Resource Strain: Not on file   Food Insecurity: Not on file   Transportation Needs: Not on file   Physical Activity: Not on file   Stress: Not on file   Social Connections: Not on file   Intimate Partner Violence: Not on file   Housing Stability: Not on file      Current Medications:     Current Outpatient Medications   Medication Sig Dispense Refill   • cetirizine (ZyrTEC) 10 mg tablet Take 10 mg by mouth daily     • multivitamin (THERAGRAN) TABS Take by mouth daily       No current facility-administered medications for this visit. Allergies: Allergies   Allergen Reactions   • Bee Venom Edema      Physical Exam:     /88 (BP Location: Left arm, Patient Position: Sitting)   Pulse 55   Temp 97.5 °F (36.4 °C)   Ht 6' 1" (1.854 m)   Wt 86.6 kg (191 lb)   SpO2 99%   BMI 25.20 kg/m²     Physical Exam  Vitals reviewed. Constitutional:       Appearance: He is well-developed. HENT:      Head: Normocephalic and atraumatic.       Right Ear: Tympanic membrane, ear canal and external ear normal.      Left Ear: Tympanic membrane, ear canal and external ear normal.      Nose: Nose normal.      Mouth/Throat:      Mouth: Mucous membranes are moist.   Eyes:      Extraocular Movements: Extraocular movements intact. Conjunctiva/sclera: Conjunctivae normal.      Pupils: Pupils are equal, round, and reactive to light. Neck:      Thyroid: No thyromegaly. Vascular: No JVD. Cardiovascular:      Rate and Rhythm: Normal rate and regular rhythm. Pulses: Normal pulses. Heart sounds: Normal heart sounds. No murmur heard. Pulmonary:      Effort: Pulmonary effort is normal. No respiratory distress. Breath sounds: Normal breath sounds. No wheezing or rales. Abdominal:      General: Bowel sounds are normal. There is no distension. Palpations: Abdomen is soft. There is no mass. Tenderness: There is no abdominal tenderness. Musculoskeletal:         General: Tenderness (with abduction/anterior flexion of R shoulder. TTP over the R epicondyles. ) present. No swelling or deformity. Normal range of motion. Cervical back: Normal range of motion and neck supple. No muscular tenderness. Right lower leg: No edema. Left lower leg: No edema. Lymphadenopathy:      Cervical: No cervical adenopathy. Skin:     General: Skin is warm. Capillary Refill: Capillary refill takes less than 2 seconds. Neurological:      Mental Status: He is alert and oriented to person, place, and time. Cranial Nerves: No cranial nerve deficit. Sensory: No sensory deficit. Motor: No weakness or abnormal muscle tone. Coordination: Coordination normal.      Gait: Gait normal.      Deep Tendon Reflexes: Reflexes normal.   Psychiatric:         Mood and Affect: Mood normal.         Behavior: Behavior normal.         Thought Content:  Thought content normal.         Judgment: Judgment normal.          Shayne Hernandez MD  [address]

## 2023-09-28 ENCOUNTER — OFFICE VISIT (OUTPATIENT)
Dept: PHYSICAL THERAPY | Facility: CLINIC | Age: 43
End: 2023-09-28
Payer: COMMERCIAL

## 2023-09-28 DIAGNOSIS — M25.511 CHRONIC RIGHT SHOULDER PAIN: ICD-10-CM

## 2023-09-28 DIAGNOSIS — G89.29 CHRONIC RIGHT SHOULDER PAIN: ICD-10-CM

## 2023-09-28 DIAGNOSIS — M25.521 RIGHT ELBOW PAIN: Primary | ICD-10-CM

## 2023-09-28 PROCEDURE — 97112 NEUROMUSCULAR REEDUCATION: CPT | Performed by: PHYSICAL THERAPIST

## 2023-09-28 PROCEDURE — 97140 MANUAL THERAPY 1/> REGIONS: CPT | Performed by: PHYSICAL THERAPIST

## 2023-09-28 PROCEDURE — 97110 THERAPEUTIC EXERCISES: CPT | Performed by: PHYSICAL THERAPIST

## 2023-09-28 NOTE — PROGRESS NOTES
Daily Note     Today's date: 2023  Patient name: Piedad Osuna  : 1980  MRN: [id number]  Referring provider: Nenita Peralta*  Dx:   Encounter Diagnosis     ICD-10-CM    1. Right elbow pain  M25.521       2. Chronic right shoulder pain  M25.511     G89.29           Start Time: 0854  Stop Time: 0945  Total time in clinic (min): 51 minutes    Subjective: Pt reports gradual improvement in symptoms at elbow and shoulder. Objective: See treatment diary below      Assessment: Tolerated treatment well. Shoulder and elbow motion improving in all planes and less restricted toward end range motion. Updated HEP for shoulder and elbow. Good compliance noted throughout program. Patient exhibited good technique with therapeutic exercises and would benefit from continued PT. Plan: Continue per plan of care. Re-evaluate next session.       Precautions: Standard       Manuals          R elbow ROM (flexion/pronation)  C/ supination/pronation x 5' C/ supination/pronation x 5 C/ supination/pronation x 5 C/ supination/pronation x 5         Ulnohumeral, radiohumeral, radioulnar mobilizations Radial head Gr III Radial head Gr III Radial head Gr III Radial head Gr III         R shoulder IR mobilizations Gr III  Gr III  Not performed  Gr III                      Neuro Re-Ed             TB Ws 2x10 ytb   10x with press  2x10 rtb          TB No moneys             Flexbar pronation 2x10 green 2x10 green 2x10 green 2x10 green         Flexbar supination 2x10 green 2x10 green 2x10 green 2x10 green         TB IR/ER 30x rtb             Prone Ts  2x10 3#           Prone Ws  2x10 3#           PNF ER c/ TB  2x10 rtb            Serratus press with circles   3x10 cw/ccw 5# 2x10  cw/ccw         Ther Ex             Supination/pronation with weight              UBE 3'/3' 3'/3' 3'/3' 3'/3'         Sleeper stretch              Wrist flexor stretch WB             Wrist extension stretch  WB             Wrist extension PRE 2x10 5# 2x10 5#           Wrist flexion PRE 2x10 5# 2x10 5#           Biceps curl with supination   3x10 10# 3x10 10#         Supine triceps extension    isolated 5# 2x10          Ther Activity             WB shoulder taps on plinth   2x20                       Gait Training                                       Modalities                                       1:1 with PT from [phone]

## 2023-09-29 PROBLEM — M25.521 RIGHT ELBOW PAIN: Status: ACTIVE | Noted: 2023-09-29

## 2023-09-29 PROBLEM — M25.511 ACUTE PAIN OF RIGHT SHOULDER: Status: ACTIVE | Noted: 2023-09-29

## 2023-10-06 ENCOUNTER — EVALUATION (OUTPATIENT)
Dept: PHYSICAL THERAPY | Facility: CLINIC | Age: 43
End: 2023-10-06
Payer: COMMERCIAL

## 2023-10-06 DIAGNOSIS — G89.29 CHRONIC RIGHT SHOULDER PAIN: ICD-10-CM

## 2023-10-06 DIAGNOSIS — M25.521 RIGHT ELBOW PAIN: Primary | ICD-10-CM

## 2023-10-06 DIAGNOSIS — M25.511 CHRONIC RIGHT SHOULDER PAIN: ICD-10-CM

## 2023-10-06 PROCEDURE — 97110 THERAPEUTIC EXERCISES: CPT | Performed by: PHYSICAL THERAPIST

## 2023-10-06 PROCEDURE — 97140 MANUAL THERAPY 1/> REGIONS: CPT | Performed by: PHYSICAL THERAPIST

## 2023-10-06 PROCEDURE — 97112 NEUROMUSCULAR REEDUCATION: CPT | Performed by: PHYSICAL THERAPIST

## 2023-10-06 NOTE — PROGRESS NOTES
FP-I[facility]-udjyzcubop/Hold    Today's date: 10/6/2023  Patient name: Elizabeth Lange  : 1980  MRN: [id number]  Referring provider: Gordo Chaney*  Dx:   Encounter Diagnosis     ICD-10-CM    1. Right elbow pain  M25.521       2. Chronic right shoulder pain  M25.511     G89.29                    Assessment  Assessment details: Elizabeth Lange is a 44 y.o. male who presents with signs and symptoms consistent of right elbow medial epicondylagia and right subacromial shoulder pain. Overall, patient is making good progress with conservative measures. His shoulder ROM has normalized and he no longer presents with GIRD. Pt no longer present with elbow stiffness in pronation and flexion. Pain in shoulder and elbow are gradually reducing in intensity and frequency. Pt continues to be active with house project and at work. Pt continues to have positive impingement signs and pain upon provocating tests at the shoulder. Recommend patient continue to perform prescribed exercises a few times per week via HEP. He may call to schedule a re-evaluation in 3-4 weeks should symptoms not improve. I explained to patient that he may need to continue to load the rotator cuff and elbow over the next 6-8 weeks for optimal improvements. No concern at this time for structural pathology. Pt is in agreement with POC and will call if anything worsens.  Thank you for this referral.     Primary movement impairments:   1) Elbow stiffness: pronation and flexion: improved  2) Elbow joint mobility restrictions: improved  3) GIRD: improved  4) Weakness of RTC musculature: Improving     Impairments: abnormal muscle firing, abnormal or restricted ROM, activity intolerance, impaired physical strength, lacks appropriate home exercise program, pain with function and scapular dyskinesis  Understanding of Dx/Px/POC: good   Prognosis: good    Goals  Short Term Goals: to be achieved by 4 weeks  1) Patient to be independent with basic HEP.-MET  2) Decrease pain to 3/10 at its worst.-MET  3) Increase right elbow ROM to full with pronation and flexion-MET  4) Increase shoulder strength by 1/2 MMT grade in all deficient planes.-Partially met    Long Term Goals: to be achieved by discharge  1) FOTO equal to or greater than expected. -Partially met  2) Patient to be independent with comprehensive HEP.-MET  3) Patient will demonstrate maximal over head reaching-MET  4) Increase UE strength to 5/5 MMT grade in all planes to improve a/iadls.-Partially met        Plan  Patient would benefit from: PT eval and skilled physical therapy  Planned modality interventions: low level laser therapy  Planned therapy interventions: joint mobilization, manual therapy, therapeutic activities, therapeutic exercise and home exercise program  Frequency: 1x in a month for re-assessment. Subjective Evaluation    History of Present Illness  Mechanism of injury: History of Current Injury: R elbow: Pt reports 1 month history of right elbow pain. Pt notes that he was doing a lot of yard work recently. He reports stiffness in rotating his elbow. Pt did a lot of shoveling and sifting recently. Pt denies any swelling. Pt denies any locking with twisting. Pt denies any N&T in the RUE at this time. Right shoulder: Pt notes right shoulder pain since high school when he was swimming. Pt has avoided exercising and swimming due to his right shoulder. He would like to return to exercise. Pain location/Descriptors: P1: R medial and lateral elbow pain. P2: Dull anterior and posterior right shoulder pain which can be sharp posteriorly depending on activity. Aggravating factors: P1: Lifting, grabbing, rotating, WB on hands. P2: Over head activities, swimming. Easing factors: Tylenol  24 HR pattern: Depending on activity. Imaging: None recently  Special Questions: Denies current N&T, Denies loss of  strength  Patient goals:  Learn exercises to fix it and learn movements to stay away from. Hobbies/Interest: Trying to fix his garage. Swimming. Run from time to time. Occupation: . Some lifting but nothing overhead. Pain  Pain scale: 0-2. Pain scale at highest: Right elbow: 8/10 (transient)/ R shoulder: 5-6 (annoying)           Objective     Tenderness     Right Elbow   Tenderness in the distal triceps tendon. No tenderness in the distal biceps tendon, lateral epicondyle and proximal radioulnar joint. Right Wrist/Hand   Tenderness in the distal triceps tendon. No tenderness in the distal biceps tendon, lateral epicondyle and proximal radioulnar joint. Active Range of Motion   Left Shoulder   External rotation 90°: 100 degrees   Internal rotation 90°: 75 degrees   Internal rotation BTB: T7     Right Shoulder   Flexion: Right shoulder active forward flexion: WNL. Abduction: Right shoulder active abduction: WNL. External rotation 90°: 105 degrees  Internal rotation 0°: 70 degrees   Internal rotation BTB: L5     Right Elbow   Flexion: Right elbow active flexion: WNL pain at end range    Extension: Right elbow active extension: WNL pain at end range. Forearm supination: Right elbow active supination: WNL. Forearm pronation: Right elbow active pronation: Mild limitation with pain. Joint Play     Right Elbow   Hypomobile in the humeroulnar joint, humeroradial joint and proximal radioulnar joint. Strength/Myotome Testing     Left Shoulder   Normal muscle strength    Right Shoulder     Planes of Motion   Flexion: 4+   Abduction: 4+   External rotation at 0°: 4 (Pain)   External rotation at 90°: 4+   Internal rotation at 0°: 5   Internal rotation at 90°: 4+     Tests     Right Shoulder   Positive active compression (Pettis), empty can and passive horizontal adduction. Negative external rotation lag sign, Neer's and painful arc. Right Elbow   Positive active medial epicondylitis, passive medial epicondylitis, radial tunnel and reverse Cozen's test positive. Negative Cozen's, Maudsley's, milking maneuver, Mill's, moving valgus stress, valgus stress at 0 degrees, valgus stress at 30 degrees, varus stress at 0 degrees and varus stress at 30 degrees.      Additional Tests Details   strength:   105# R/100# L          Precautions: Standard       Manuals 9/8 9/12 9/19 9/28 10/6        R elbow ROM (flexion/pronation)  C/ supination/pronation x 5' C/ supination/pronation x 5 C/ supination/pronation x 5 C/ supination/pronation x 5 C/ supination/pronation x 5        Ulnohumeral, radiohumeral, radioulnar mobilizations Radial head Gr III Radial head Gr III Radial head Gr III Radial head Gr III Radial head Gr III        R shoulder IR mobilizations Gr III  Gr III  Not performed  Gr III                      Neuro Re-Ed             TB Ws 2x10 ytb   10x with press  2x10 rtb          TB No moneys             Flexbar pronation 2x10 green 2x10 green 2x10 green 2x10 green         Flexbar supination 2x10 green 2x10 green 2x10 green 2x10 green         TB IR/ER 30x rtb             Prone Ts  2x10 3#           Prone Ws  2x10 3#           PNF ER c/ TB  2x10 rtb            Serratus press with circles   3x10 cw/ccw 5# 2x10  cw/ccw         Ther Ex             Supination/pronation with weight              UBE 3'/3' 3'/3' 3'/3' 3'/3' 3'/3'        Sleeper stretch              Wrist flexor stretch WB             Wrist extension stretch  WB             Wrist extension PRE 2x10 5# 2x10 5#           Wrist flexion PRE 2x10 5# 2x10 5#           Biceps curl with supination   3x10 10# 3x10 10#         Supine triceps extension    isolated 5# 2x10          Ther Activity             WB shoulder taps on plinth   2x20                       Gait Training                                       Modalities                                       1:1 with PT from [phone]  Pt was re-evaluated and consulted today x 30 minutes

## 2023-11-21 ENCOUNTER — APPOINTMENT (OUTPATIENT)
Dept: LAB | Facility: CLINIC | Age: 43
End: 2023-11-21
Payer: COMMERCIAL

## 2023-11-21 DIAGNOSIS — R79.89 ELEVATED LIVER FUNCTION TESTS: ICD-10-CM

## 2023-11-21 DIAGNOSIS — Z13.6 SCREENING FOR CARDIOVASCULAR, RESPIRATORY, AND GENITOURINARY DISEASES: ICD-10-CM

## 2023-11-21 DIAGNOSIS — Z12.5 SCREENING FOR PROSTATE CANCER: ICD-10-CM

## 2023-11-21 DIAGNOSIS — Z13.83 SCREENING FOR CARDIOVASCULAR, RESPIRATORY, AND GENITOURINARY DISEASES: ICD-10-CM

## 2023-11-21 DIAGNOSIS — Z80.42 FAMILY HISTORY OF PROSTATE CANCER: ICD-10-CM

## 2023-11-21 DIAGNOSIS — Z13.89 SCREENING FOR CARDIOVASCULAR, RESPIRATORY, AND GENITOURINARY DISEASES: ICD-10-CM

## 2023-11-21 LAB
ALBUMIN SERPL BCP-MCNC: 4.3 G/DL (ref 3.5–5)
ALP SERPL-CCNC: 35 U/L (ref 34–104)
ALT SERPL W P-5'-P-CCNC: 18 U/L (ref 7–52)
ANION GAP SERPL CALCULATED.3IONS-SCNC: 7 MMOL/L
AST SERPL W P-5'-P-CCNC: 13 U/L (ref 13–39)
BASOPHILS # BLD AUTO: 0.1 THOUSANDS/ÂΜL (ref 0–0.1)
BASOPHILS NFR BLD AUTO: 2 % (ref 0–1)
BILIRUB SERPL-MCNC: 1.39 MG/DL (ref 0.2–1)
BUN SERPL-MCNC: 14 MG/DL (ref 5–25)
CALCIUM SERPL-MCNC: 9.6 MG/DL (ref 8.4–10.2)
CHLORIDE SERPL-SCNC: 103 MMOL/L (ref 96–108)
CHOLEST SERPL-MCNC: 187 MG/DL
CO2 SERPL-SCNC: 30 MMOL/L (ref 21–32)
CREAT SERPL-MCNC: 0.85 MG/DL (ref 0.6–1.3)
EOSINOPHIL # BLD AUTO: 0.21 THOUSAND/ÂΜL (ref 0–0.61)
EOSINOPHIL NFR BLD AUTO: 4 % (ref 0–6)
ERYTHROCYTE [DISTWIDTH] IN BLOOD BY AUTOMATED COUNT: 12.8 % (ref 11.6–15.1)
GFR SERPL CREATININE-BSD FRML MDRD: 107 ML/MIN/1.73SQ M
GLUCOSE P FAST SERPL-MCNC: 105 MG/DL (ref 65–99)
HCT VFR BLD AUTO: 45.3 % (ref 36.5–49.3)
HDLC SERPL-MCNC: 54 MG/DL
HGB BLD-MCNC: 15.6 G/DL (ref 12–17)
IMM GRANULOCYTES # BLD AUTO: 0.01 THOUSAND/UL (ref 0–0.2)
IMM GRANULOCYTES NFR BLD AUTO: 0 % (ref 0–2)
LDLC SERPL CALC-MCNC: 114 MG/DL (ref 0–100)
LYMPHOCYTES # BLD AUTO: 1.77 THOUSANDS/ÂΜL (ref 0.6–4.47)
LYMPHOCYTES NFR BLD AUTO: 34 % (ref 14–44)
MCH RBC QN AUTO: 30.3 PG (ref 26.8–34.3)
MCHC RBC AUTO-ENTMCNC: 34.4 G/DL (ref 31.4–37.4)
MCV RBC AUTO: 88 FL (ref 82–98)
MONOCYTES # BLD AUTO: 0.5 THOUSAND/ÂΜL (ref 0.17–1.22)
MONOCYTES NFR BLD AUTO: 10 % (ref 4–12)
NEUTROPHILS # BLD AUTO: 2.56 THOUSANDS/ÂΜL (ref 1.85–7.62)
NEUTS SEG NFR BLD AUTO: 50 % (ref 43–75)
NONHDLC SERPL-MCNC: 133 MG/DL
NRBC BLD AUTO-RTO: 0 /100 WBCS
PLATELET # BLD AUTO: 190 THOUSANDS/UL (ref 149–390)
PMV BLD AUTO: 10.4 FL (ref 8.9–12.7)
POTASSIUM SERPL-SCNC: 4.8 MMOL/L (ref 3.5–5.3)
PROT SERPL-MCNC: 7 G/DL (ref 6.4–8.4)
PSA SERPL-MCNC: 0.48 NG/ML (ref 0–4)
RBC # BLD AUTO: 5.15 MILLION/UL (ref 3.88–5.62)
SODIUM SERPL-SCNC: 140 MMOL/L (ref 135–147)
TRIGL SERPL-MCNC: 96 MG/DL
WBC # BLD AUTO: 5.15 THOUSAND/UL (ref 4.31–10.16)

## 2023-11-21 PROCEDURE — G0103 PSA SCREENING: HCPCS

## 2023-11-21 PROCEDURE — 85025 COMPLETE CBC W/AUTO DIFF WBC: CPT

## 2023-11-21 PROCEDURE — 36415 COLL VENOUS BLD VENIPUNCTURE: CPT

## 2023-11-21 PROCEDURE — 80061 LIPID PANEL: CPT

## 2023-11-21 PROCEDURE — 80053 COMPREHEN METABOLIC PANEL: CPT

## 2024-08-19 ENCOUNTER — TELEPHONE (OUTPATIENT)
Dept: FAMILY MEDICINE CLINIC | Facility: CLINIC | Age: 44
End: 2024-08-19

## 2024-10-08 ENCOUNTER — APPOINTMENT (EMERGENCY)
Dept: RADIOLOGY | Facility: HOSPITAL | Age: 44
End: 2024-10-08
Payer: COMMERCIAL

## 2024-10-08 ENCOUNTER — HOSPITAL ENCOUNTER (EMERGENCY)
Facility: HOSPITAL | Age: 44
Discharge: HOME | End: 2024-10-08
Attending: EMERGENCY MEDICINE
Payer: COMMERCIAL

## 2024-10-08 ENCOUNTER — OFFICE VISIT (OUTPATIENT)
Dept: FAMILY MEDICINE CLINIC | Facility: CLINIC | Age: 44
End: 2024-10-08
Payer: COMMERCIAL

## 2024-10-08 VITALS
DIASTOLIC BLOOD PRESSURE: 88 MMHG | HEART RATE: 51 BPM | RESPIRATION RATE: 20 BRPM | WEIGHT: 187.61 LBS | TEMPERATURE: 68 F | BODY MASS INDEX: 24.75 KG/M2 | SYSTOLIC BLOOD PRESSURE: 132 MMHG | OXYGEN SATURATION: 98 %

## 2024-10-08 DIAGNOSIS — F41.9 ANXIETY: ICD-10-CM

## 2024-10-08 DIAGNOSIS — R07.9 CHEST PAIN, UNSPECIFIED TYPE: Primary | ICD-10-CM

## 2024-10-08 DIAGNOSIS — R07.89 OTHER CHEST PAIN: ICD-10-CM

## 2024-10-08 DIAGNOSIS — R07.9 CHEST PAIN, UNSPECIFIED TYPE: ICD-10-CM

## 2024-10-08 DIAGNOSIS — F41.1 ANXIETY STATE: Primary | ICD-10-CM

## 2024-10-08 DIAGNOSIS — R47.9 DIFFICULTY WITH SPEECH: ICD-10-CM

## 2024-10-08 DIAGNOSIS — G47.9 SLEEP DISTURBANCE: ICD-10-CM

## 2024-10-08 LAB
ALBUMIN SERPL BCG-MCNC: 4.2 G/DL (ref 3.5–5)
ALP SERPL-CCNC: 34 U/L (ref 34–104)
ALT SERPL W P-5'-P-CCNC: 12 U/L (ref 7–52)
ANION GAP SERPL CALCULATED.3IONS-SCNC: 6 MMOL/L (ref 4–13)
AST SERPL W P-5'-P-CCNC: 11 U/L (ref 13–39)
ATRIAL RATE: 51 BPM
BASOPHILS # BLD AUTO: 0.07 THOUSANDS/ΜL (ref 0–0.1)
BASOPHILS NFR BLD AUTO: 2 % (ref 0–1)
BILIRUB SERPL-MCNC: 1.35 MG/DL (ref 0.2–1)
BUN SERPL-MCNC: 12 MG/DL (ref 5–25)
CALCIUM SERPL-MCNC: 8.8 MG/DL (ref 8.4–10.2)
CARDIAC TROPONIN I PNL SERPL HS: 3 NG/L
CHLORIDE SERPL-SCNC: 104 MMOL/L (ref 96–108)
CO2 SERPL-SCNC: 28 MMOL/L (ref 21–32)
CREAT SERPL-MCNC: 0.75 MG/DL (ref 0.6–1.3)
EOSINOPHIL # BLD AUTO: 0.14 THOUSAND/ΜL (ref 0–0.61)
EOSINOPHIL NFR BLD AUTO: 3 % (ref 0–6)
ERYTHROCYTE [DISTWIDTH] IN BLOOD BY AUTOMATED COUNT: 12.8 % (ref 11.6–15.1)
GFR SERPL CREATININE-BSD FRML MDRD: 112 ML/MIN/1.73SQ M
GLUCOSE SERPL-MCNC: 105 MG/DL (ref 65–140)
HCT VFR BLD AUTO: 42.6 % (ref 36.5–49.3)
HGB BLD-MCNC: 14.5 G/DL (ref 12–17)
IMM GRANULOCYTES # BLD AUTO: 0 THOUSAND/UL (ref 0–0.2)
IMM GRANULOCYTES NFR BLD AUTO: 0 % (ref 0–2)
LYMPHOCYTES # BLD AUTO: 1.66 THOUSANDS/ΜL (ref 0.6–4.47)
LYMPHOCYTES NFR BLD AUTO: 35 % (ref 14–44)
MCH RBC QN AUTO: 30.1 PG (ref 26.8–34.3)
MCHC RBC AUTO-ENTMCNC: 34 G/DL (ref 31.4–37.4)
MCV RBC AUTO: 89 FL (ref 82–98)
MONOCYTES # BLD AUTO: 0.45 THOUSAND/ΜL (ref 0.17–1.22)
MONOCYTES NFR BLD AUTO: 9 % (ref 4–12)
NEUTROPHILS # BLD AUTO: 2.46 THOUSANDS/ΜL (ref 1.85–7.62)
NEUTS SEG NFR BLD AUTO: 51 % (ref 43–75)
NRBC BLD AUTO-RTO: 0 /100 WBCS
P AXIS: 61 DEGREES
PLATELET # BLD AUTO: 184 THOUSANDS/UL (ref 149–390)
PMV BLD AUTO: 9.1 FL (ref 8.9–12.7)
POTASSIUM SERPL-SCNC: 3.8 MMOL/L (ref 3.5–5.3)
PR INTERVAL: 146 MS
PROT SERPL-MCNC: 6.7 G/DL (ref 6.4–8.4)
QRS AXIS: -56 DEGREES
QRSD INTERVAL: 116 MS
QT INTERVAL: 432 MS
QTC INTERVAL: 398 MS
RBC # BLD AUTO: 4.81 MILLION/UL (ref 3.88–5.62)
SODIUM SERPL-SCNC: 138 MMOL/L (ref 135–147)
T WAVE AXIS: 48 DEGREES
VENTRICULAR RATE: 51 BPM
WBC # BLD AUTO: 4.78 THOUSAND/UL (ref 4.31–10.16)

## 2024-10-08 PROCEDURE — 85025 COMPLETE CBC W/AUTO DIFF WBC: CPT | Performed by: EMERGENCY MEDICINE

## 2024-10-08 PROCEDURE — 99215 OFFICE O/P EST HI 40 MIN: CPT | Performed by: FAMILY MEDICINE

## 2024-10-08 PROCEDURE — 93000 ELECTROCARDIOGRAM COMPLETE: CPT | Performed by: FAMILY MEDICINE

## 2024-10-08 PROCEDURE — 84484 ASSAY OF TROPONIN QUANT: CPT | Performed by: EMERGENCY MEDICINE

## 2024-10-08 PROCEDURE — 99285 EMERGENCY DEPT VISIT HI MDM: CPT

## 2024-10-08 PROCEDURE — 99285 EMERGENCY DEPT VISIT HI MDM: CPT | Performed by: EMERGENCY MEDICINE

## 2024-10-08 PROCEDURE — 36415 COLL VENOUS BLD VENIPUNCTURE: CPT

## 2024-10-08 PROCEDURE — 93010 ELECTROCARDIOGRAM REPORT: CPT | Performed by: STUDENT IN AN ORGANIZED HEALTH CARE EDUCATION/TRAINING PROGRAM

## 2024-10-08 PROCEDURE — 71045 X-RAY EXAM CHEST 1 VIEW: CPT

## 2024-10-08 PROCEDURE — 93005 ELECTROCARDIOGRAM TRACING: CPT

## 2024-10-08 PROCEDURE — 80053 COMPREHEN METABOLIC PANEL: CPT | Performed by: EMERGENCY MEDICINE

## 2024-10-08 NOTE — ED PROVIDER NOTES
Final diagnoses:   Anxiety state   Chest pain, unspecified type     ED Disposition       ED Disposition   Discharge    Condition   Stable    Date/Time   Tue Oct 8, 2024 12:26 PM    Comment   Lyle Dorado discharge to home/self care.                   Assessment & Plan       Medical Decision Making        Initial ED assessment:   44-year-old male, woke up with chest pain at 7 AM, constant since, has been greater then 5 hours, no headache no neck pain no shortness of breath does have intermittent tingling of his fingers when he hyperventilates    Pathology at risk for includes but is not limited to:   Likely anxiety induced chest pain/anxiety, consider GERD, consider pneumothorax less likely ACS    Initial ED plan:   EKG, blood work, chest x-ray        Final ED summary/disposition:   After evaluation and workup in the emergency department, workup unremarkable patient discharged will follow with his PCP for consideration of a daily antianxiety medication, also discussed behavioral modification things such as daily cardiovascular exercise    Amount and/or Complexity of Data Reviewed  Labs: ordered.  Radiology: ordered and independent interpretation performed.             Medications - No data to display    ED Risk Strat Scores                           SBIRT 22yo+      Flowsheet Row Most Recent Value   Initial Alcohol Screen: US AUDIT-C     1. How often do you have a drink containing alcohol? 6 Filed at: 10/08/2024 1118   2. How many drinks containing alcohol do you have on a typical day you are drinking?  1 Filed at: 10/08/2024 1118   3a. Male UNDER 65: How often do you have five or more drinks on one occasion? 0 Filed at: 10/08/2024 1118   3b. FEMALE Any Age, or MALE 65+: How often do you have 4 or more drinks on one occassion? 0 Filed at: 10/08/2024 1118   Audit-C Score 7 Filed at: 10/08/2024 1118   Full Alcohol Screen: US AUDIT    4. How often during the last year have you found that you were not able to stop  drinking once you had started? 0 Filed at: 10/08/2024 1118   5. How often during past year have you failed to do what was normally expected of you because of drinking?  0 Filed at: 10/08/2024 1118   6. How often in past year have you needed a first drink in the morning to get yourself going after a heavy drinking session?  0 Filed at: 10/08/2024 1118   7. How often in past year have you had feeling of guilt or remorse after drinking?  0 Filed at: 10/08/2024 1118   8. How often in past year have you been unable to remember what happened night before because you had been drinking?  0 Filed at: 10/08/2024 1118   9. Have you or someone else been injured as a result of your drinking?  0 Filed at: 10/08/2024 1118   10. Has a relative, friend, doctor or other health worker been concerned about your drinking and suggested you cut down?  0 Filed at: 10/08/2024 1118   AUDIT Total Score 7 Filed at: 10/08/2024 1118   KAREN: How many times in the past year have you...    Used an illegal drug or used a prescription medication for non-medical reasons? Never Filed at: 10/08/2024 1118                            History of Present Illness       Chief Complaint   Patient presents with    Chest Pain     CP since 0700am. Mid sternal with radiation to L arm.+SOB per patient no cardiac hx. Patient got 324 ASA and 1 nitro with EMS.        Past Medical History:   Diagnosis Date    Asthma     mild as a child    Multiple lipomas       History reviewed. No pertinent surgical history.   Family History   Problem Relation Age of Onset    Thyroid cancer Mother     Prostate cancer Father     Hypertension Father     Prostate cancer Paternal Grandfather       Social History     Tobacco Use    Smoking status: Former     Current packs/day: 0.00     Average packs/day: 1.5 packs/day for 3.0 years (4.5 ttl pk-yrs)     Types: Cigarettes     Start date: 1/10/2006     Quit date: 1/10/2009     Years since quitting: 15.7     Passive exposure: Never    Smokeless  tobacco: Former     Quit date: 1/10/2009   Vaping Use    Vaping status: Never Used   Substance Use Topics    Alcohol use: Yes     Alcohol/week: 28.0 standard drinks of alcohol     Types: 28 Cans of beer per week    Drug use: No      E-Cigarette/Vaping    E-Cigarette Use Never User       E-Cigarette/Vaping Substances      I have reviewed and agree with the history as documented.     44-year-old female presents with chest pain.  States that has been intermittent all week, typically lasts anywhere from 2 to 6 hours.  Says today he woke up with the discomfort in the center of his chest, woke up at 7 AM, feels a tightness sensation.  Is been constant since so he came to the ER.  Admits he has been under a large amount of stress recently.  Has intermittent episodes of feeling very overwhelmed where he starts hyperventilating.  No diaphoresis no shortness of breath, otherwise he exercises regularly has no chest pain with exercise actually says this makes him feel better and takes his mind off of it.  A lot of anxiety questionable depression, no SI no HI does not take any medications for this.      Chest Pain  Associated symptoms: no abdominal pain, no cough, no diaphoresis, no dizziness, no fever, no headache, no nausea, no numbness, no palpitations, no shortness of breath and not vomiting        Review of Systems   Constitutional:  Negative for activity change, chills, diaphoresis and fever.   HENT:  Negative for congestion, sinus pressure and sore throat.    Eyes:  Negative for pain and visual disturbance.   Respiratory:  Negative for cough, chest tightness, shortness of breath, wheezing and stridor.    Cardiovascular:  Positive for chest pain. Negative for palpitations.   Gastrointestinal:  Negative for abdominal distention, abdominal pain, constipation, diarrhea, nausea and vomiting.   Genitourinary:  Negative for dysuria and frequency.   Musculoskeletal:  Negative for neck pain and neck stiffness.   Skin:  Negative  for rash.   Neurological:  Negative for dizziness, speech difficulty, light-headedness, numbness and headaches.   Psychiatric/Behavioral:  The patient is nervous/anxious.            Objective       ED Triage Vitals   Temperature Pulse Blood Pressure Respirations SpO2 Patient Position - Orthostatic VS   10/08/24 1125 10/08/24 1117 10/08/24 1117 10/08/24 1117 10/08/24 1117 10/08/24 1117   98.4 °F (36.9 °C) 55 147/84 20 98 % Sitting      Temp Source Heart Rate Source BP Location FiO2 (%) Pain Score    10/08/24 1125 10/08/24 1117 10/08/24 1117 -- --    Oral Monitor Right arm        Vitals      Date and Time Temp Pulse SpO2 Resp BP Pain Score FACES Pain Rating User   10/08/24 1200 68 °F (20 °C) 51 98 % 20 132/88 -- -- CR   10/08/24 1130 -- 50 98 % 20 143/83 -- -- CR   10/08/24 1125 98.4 °F (36.9 °C) -- -- -- -- -- -- CR   10/08/24 1117 -- 55 98 % 20 147/84 -- -- CR          Above vital signs as temperature is 68.,  This is incorrect, this was a charting area should have been 98.      Physical Exam  Vitals reviewed.   Constitutional:       General: He is not in acute distress.     Appearance: He is well-developed. He is not diaphoretic.   HENT:      Head: Normocephalic and atraumatic.      Right Ear: External ear normal.      Left Ear: External ear normal.      Nose: Nose normal.   Eyes:      General:         Right eye: No discharge.         Left eye: No discharge.      Pupils: Pupils are equal, round, and reactive to light.   Neck:      Trachea: No tracheal deviation.   Cardiovascular:      Rate and Rhythm: Normal rate and regular rhythm.      Heart sounds: Normal heart sounds. No murmur heard.  Pulmonary:      Effort: Pulmonary effort is normal. No respiratory distress.      Breath sounds: Normal breath sounds. No stridor.   Abdominal:      General: There is no distension.      Palpations: Abdomen is soft.      Tenderness: There is no abdominal tenderness. There is no guarding or rebound.   Musculoskeletal:          General: Normal range of motion.      Cervical back: Normal range of motion and neck supple.   Skin:     General: Skin is warm and dry.      Coloration: Skin is not pale.      Findings: No erythema.   Neurological:      General: No focal deficit present.      Mental Status: He is alert and oriented to person, place, and time.         Results Reviewed       Procedure Component Value Units Date/Time    HS Troponin I 4hr [[id number]]     Lab Status: No result Specimen: Blood     HS Troponin 0hr (reflex protocol) [[id number]]  (Normal) Collected: 10/08/24 1127    Lab Status: Final result Specimen: Blood from Arm, Left Updated: 10/08/24 1202     hs TnI 0hr 3 ng/L     HS Troponin I 2hr [[id number]]     Lab Status: No result Specimen: Blood     Comprehensive metabolic panel [[id number]]  (Abnormal) Collected: 10/08/24 1127    Lab Status: Final result Specimen: Blood from Arm, Left Updated: 10/08/24 1159     Sodium 138 mmol/L      Potassium 3.8 mmol/L      Chloride 104 mmol/L      CO2 28 mmol/L      ANION GAP 6 mmol/L      BUN 12 mg/dL      Creatinine 0.75 mg/dL      Glucose 105 mg/dL      Calcium 8.8 mg/dL      AST 11 U/L      ALT 12 U/L      Alkaline Phosphatase 34 U/L      Total Protein 6.7 g/dL      Albumin 4.2 g/dL      Total Bilirubin 1.35 mg/dL      eGFR 112 ml/min/1.73sq m     Narrative:      National Kid[employer] guidelines for Chronic Kidney Disease (CKD):     Stage 1 with normal or high GFR (GFR > 90 mL/min/1.73 square meters)    Stage 2 Mild CKD (GFR = 60-89 mL/min/1.73 square meters)    Stage 3A Moderate CKD (GFR = 45-59 mL/min/1.73 square meters)    Stage 3B Moderate CKD (GFR = 30-44 mL/min/1.73 square meters)    Stage 4 Severe CKD (GFR = 15-29 mL/min/1.73 square meters)    Stage 5 End Stage CKD (GFR <15 mL/min/1.73 square meters)  Note: GFR calculation is accurate only with a steady state creatinine    CBC and differential [[id number]]  (Abnormal) Collected: 10/08/24 1127    Lab Status: Final result  Specimen: Blood from Arm, Left Updated: 10/08/24 1137     WBC 4.78 Thousand/uL      RBC 4.81 Million/uL      Hemoglobin 14.5 g/dL      Hematocrit 42.6 %      MCV 89 fL      MCH 30.1 pg      MCHC 34.0 g/dL      RDW 12.8 %      MPV 9.1 fL      Platelets 184 Thousands/uL      nRBC 0 /100 WBCs      Segmented % 51 %      Immature Grans % 0 %      Lymphocytes % 35 %      Monocytes % 9 %      Eosinophils Relative 3 %      Basophils Relative 2 %      Absolute Neutrophils 2.46 Thousands/µL      Absolute Immature Grans 0.00 Thousand/uL      Absolute Lymphocytes 1.66 Thousands/µL      Absolute Monocytes 0.45 Thousand/µL      Eosinophils Absolute 0.14 Thousand/µL      Basophils Absolute 0.07 Thousands/µL             XR chest 1 view portable   ED Interpretation by Thomas Shanks DO (10/08 1217)   No acute pathology            ECG 12 Lead Documentation Only    Date/Time: 10/8/2024 12:30 PM    Performed by: Thomas Shanks DO  Authorized by: Thomas Shanks DO    ECG reviewed by me, the ED Provider: yes    Patient location:  ED  Interpretation:     Interpretation: normal    Rate:     ECG rate:  51    ECG rate assessment: normal    Rhythm:     Rhythm: sinus rhythm and sinus bradycardia    Ectopy:     Ectopy: none    QRS:     QRS axis:  Normal    QRS intervals:  Normal  Conduction:     Conduction: normal    ST segments:     ST segments:  Normal  T waves:     T waves: normal    Other findings:     Other findings: early repolarization        ED Medication and Procedure Management   Prior to Admission Medications   Prescriptions Last Dose Informant Patient Reported? Taking?   cetirizine (ZyrTEC) 10 mg tablet  Self Yes No   Sig: Take 10 mg by mouth daily   multivitamin (THERAGRAN) TABS  Self Yes No   Sig: Take by mouth daily      Facility-Administered Medications: None     Patient's Medications   Discharge Prescriptions    No medications on file     No discharge procedures on file.  ED SEPSIS DOCUMENTATION   Time reflects when  diagnosis was documented in both MDM as applicable and the Disposition within this note       Time User Action Codes Description Comment    10/8/2024 12:26 PM Thomas Shanks [F41.1] Anxiety state     10/8/2024 12:26 PM Thomas Shanks [R07.9] Chest pain, unspecified type                  Thomas Shanks DO  10/08/24 1237

## 2024-10-08 NOTE — PROGRESS NOTES
"Ambulatory Visit  Name: Lyle Dorado      : 1980      MRN: [id number]  Encounter Provider: Vamshi Little MD  Encounter Date: 10/8/2024   Encounter department: [facility]    Assessment & Plan  Chest pain, unspecified type    Orders:    POCT ECG    Other chest pain         Difficulty with speech         Anxiety         Sleep disturbance       Discussion: I reviewed all with patient.  ECG without acute ischemic findings.  Symptoms may all be anxiety related however, cannot rule out acute neurologic or cardiac event based on this exam and ECG.  Because of severity of symptoms, and constellation of symptoms, I have recommended ED evaluation.  Patient agrees.  911 was called.  Warm handoff made to [place] ED provider.  I spoke to patient-explained that he should follow-up in the office to discuss his anxiety/mental health if ED evaluation is unremarkable,  and patient is able to go home.  Patient to follow-up after discharge.       History of Present Illness     44-year-old male with no significant past medical history presents as a \"walk-in\" for apparent emergent symptoms.  Patient states that approximately 1 week ago, he had to turn down a promotion at work \"because I just could not do it\".  Since then, patient has had increased anxiety associated with chest discomfort radiating to the left shoulder and arm, intermittent shortness of breath, change in appetite, poor sleep, and recently neurologic symptoms including difficulty speaking and difficulty with cognition.  Patient presently has chest discomfort that is substernal but nonradiating, 5/10, and nonradiating, and is not associated with nausea, diaphoresis or worsening shortness of breath.  Sleep has been labile with patient having nightmares.  Denies suicidal thoughts or plan.  -I reviewed past medical history, past surgical history, family and social histories with patient.  Of note, there is no strong family history " of coronary artery disease, no personal history of CAD, history of smoking, or history of either HTN, hyperlipidemia, or diabetes.      Review of Systems   Constitutional:  Positive for activity change, appetite change and fatigue. Negative for chills and fever.   HENT: Negative.     Eyes: Negative.    Respiratory:  Positive for shortness of breath (intermittent).    Cardiovascular:  Positive for chest pain and palpitations. Negative for leg swelling.   Gastrointestinal: Negative.    Genitourinary: Negative.    Musculoskeletal: Negative.    Skin: Negative.    Neurological:  Positive for speech difficulty, weakness, light-headedness and numbness. Negative for dizziness and facial asymmetry.   Psychiatric/Behavioral:  Positive for decreased concentration, dysphoric mood and sleep disturbance. The patient is nervous/anxious.      Past Medical History:   Diagnosis Date    Asthma     mild as a child    Multiple lipomas      No past surgical history on file.  Family History   Problem Relation Age of Onset    Thyroid cancer Mother     Prostate cancer Father     Hypertension Father     Prostate cancer Paternal Grandfather      Social History     Tobacco Use    Smoking status: Former     Current packs/day: 0.00     Average packs/day: 1.5 packs/day for 3.0 years (4.5 ttl pk-yrs)     Types: Cigarettes     Start date: 1/10/2006     Quit date: 1/10/2009     Years since quitting: 15.7     Passive exposure: Never    Smokeless tobacco: Former     Quit date: 1/10/2009   Vaping Use    Vaping status: Never Used   Substance and Sexual Activity    Alcohol use: Yes     Alcohol/week: 28.0 standard drinks of alcohol     Types: 28 Cans of beer per week    Drug use: No    Sexual activity: Yes     Partners: Female     Current Outpatient Medications on File Prior to Visit   Medication Sig    cetirizine (ZyrTEC) 10 mg tablet Take 10 mg by mouth daily    multivitamin (THERAGRAN) TABS Take by mouth daily     Allergies   Allergen Reactions    Bee  Venom Edema     Immunization History   Administered Date(s) Administered    COVID-19 MODERNA VACC 0.5 ML IM 04/19/2021, 05/21/2021    COVID-19 PFIZER VACCINE 0.3 ML IM 12/03/2021    INFLUENZA 10/06/2018, 10/09/2019, 10/18/2021    Influenza, injectable, quadrivalent, preservative free 0.5 mL 10/09/2019, 09/26/2023    Tdap 09/01/2023     Objective     There were no vitals taken for this visit.    Physical Exam  Vitals reviewed.   Constitutional:       Comments: Anxious appearing male in no respiratory distress   HENT:      Head: Normocephalic.      Nose: No congestion.      Mouth/Throat:      Mouth: Mucous membranes are moist.   Eyes:      General: No scleral icterus.     Extraocular Movements: Extraocular movements intact.      Conjunctiva/sclera: Conjunctivae normal.      Pupils: Pupils are equal, round, and reactive to light.      Comments: No conjunctival pallor   Cardiovascular:      Rate and Rhythm: Normal rate and regular rhythm.      Pulses: Normal pulses.   Pulmonary:      Effort: Pulmonary effort is normal.      Breath sounds: No wheezing or rales.   Abdominal:      General: There is no distension.      Palpations: There is no mass.      Tenderness: There is no abdominal tenderness.   Musculoskeletal:         General: No swelling or deformity.      Cervical back: No tenderness.      Right lower leg: No edema.      Left lower leg: No edema.   Lymphadenopathy:      Cervical: No cervical adenopathy.   Skin:     General: Skin is warm.      Capillary Refill: Capillary refill takes less than 2 seconds.   Neurological:      General: No focal deficit present.      Mental Status: He is oriented to person, place, and time.      Comments: No focal deficits noted during my exam.   and rooming staff however noted patient with difficulty speaking, difficulty getting words out, and incomplete sentences.   Psychiatric:      Comments: Anxious appearing

## 2024-10-09 PROBLEM — G47.9 SLEEP DISTURBANCE: Status: ACTIVE | Noted: 2024-10-09

## 2024-10-09 PROBLEM — R07.89 OTHER CHEST PAIN: Status: ACTIVE | Noted: 2024-10-09

## 2024-10-09 PROBLEM — F41.9 ANXIETY: Status: ACTIVE | Noted: 2024-10-09

## 2024-10-09 PROBLEM — R47.9 DIFFICULTY WITH SPEECH: Status: ACTIVE | Noted: 2024-10-09

## 2024-10-09 NOTE — ASSESSMENT & PLAN NOTE
Discussion: I reviewed all with patient.  ECG without acute ischemic findings.  Symptoms may all be anxiety related however, cannot rule out acute neurologic or cardiac event based on this exam and ECG.  Because of severity of symptoms, and constellation of symptoms, I have recommended ED evaluation.  Patient agrees.  911 was called.  Warm handoff made to [place] ED provider.  I spoke to patient-explained that he should follow-up in the office to discuss his anxiety/mental health if ED evaluation is unremarkable,  and patient is able to go home.  Patient to follow-up after discharge.

## 2024-10-11 ENCOUNTER — OFFICE VISIT (OUTPATIENT)
Dept: FAMILY MEDICINE CLINIC | Facility: CLINIC | Age: 44
End: 2024-10-11
Payer: COMMERCIAL

## 2024-10-11 VITALS
SYSTOLIC BLOOD PRESSURE: 140 MMHG | HEIGHT: 73 IN | OXYGEN SATURATION: 98 % | DIASTOLIC BLOOD PRESSURE: 90 MMHG | TEMPERATURE: 97.8 F | WEIGHT: 184.8 LBS | HEART RATE: 94 BPM | BODY MASS INDEX: 24.49 KG/M2

## 2024-10-11 DIAGNOSIS — F41.9 ANXIETY: ICD-10-CM

## 2024-10-11 DIAGNOSIS — M54.12 C7 RADICULOPATHY: ICD-10-CM

## 2024-10-11 DIAGNOSIS — R07.9 CHEST PAIN, UNSPECIFIED TYPE: ICD-10-CM

## 2024-10-11 DIAGNOSIS — F33.1 MODERATE EPISODE OF RECURRENT MAJOR DEPRESSIVE DISORDER (HCC): Primary | ICD-10-CM

## 2024-10-11 PROCEDURE — 99215 OFFICE O/P EST HI 40 MIN: CPT | Performed by: FAMILY MEDICINE

## 2024-10-11 RX ORDER — ESCITALOPRAM OXALATE 10 MG/1
10 TABLET ORAL DAILY
Qty: 30 TABLET | Refills: 5 | Status: SHIPPED | OUTPATIENT
Start: 2024-10-11 | End: 2025-04-09

## 2024-10-11 RX ORDER — LORAZEPAM 0.5 MG/1
0.5 TABLET ORAL EVERY 8 HOURS PRN
Qty: 15 TABLET | Refills: 0 | Status: SHIPPED | OUTPATIENT
Start: 2024-10-11

## 2024-10-11 NOTE — ASSESSMENT & PLAN NOTE
Worsening over the last month, primarily related to work stress and fevers.  Discussed treatment options.  Patient referred for counseling (number for [facility] given).  Start Lexapro 10 mg a day with lorazepam 0.5 mg, 1/2 to 1 tablet every 8 hours as needed.  Reviewed side effects and warnings of medication with patient and wife.  Recheck 6 weeks     Orders:    escitalopram (LEXAPRO) 10 mg tablet; Take 1 tablet (10 mg total) by mouth daily    LORazepam (Ativan) 0.5 mg tablet; Take 1 tablet (0.5 mg total) by mouth every 8 (eight) hours as needed for anxiety

## 2024-10-11 NOTE — PROGRESS NOTES
Ambulatory Visit  Name: Lyle Dorado      : 1980      MRN: [id number]  Encounter Provider: Vamshi Little MD  Encounter Date: 10/11/2024   Encounter department: [facility]    Assessment & Plan  Moderate episode of recurrent major depressive disorder (HCC)  Depression Screening Follow-up Plan: Patient's depression screening was positive with a PHQ-2 score of 4. Their PHQ-9 score was 13.   I reviewed all with patient and wife.  Appears to have a recurrence of previous episode of depression.  Seems to also have a lot of anxiety accompanying this.  I reviewed labs from the ER which were generally unremarkable.  Will refer patient for counseling (number for [facility] given).  Start Lexapro 10 mg p.o. daily.  May use lorazepam 0.5 mg, 1/2 to 1 tablet every 8 hours as needed for significant anxiety.  I reviewed side effects of medications with patient and wife.  Recheck 6 weeks.  Orders:    escitalopram (LEXAPRO) 10 mg tablet; Take 1 tablet (10 mg total) by mouth daily    LORazepam (Ativan) 0.5 mg tablet; Take 1 tablet (0.5 mg total) by mouth every 8 (eight) hours as needed for anxiety    Anxiety  Worsening over the last month, primarily related to work stress and fevers.  Discussed treatment options.  Patient referred for counseling (number for [facility] given).  Start Lexapro 10 mg a day with lorazepam 0.5 mg, 1/2 to 1 tablet every 8 hours as needed.  Reviewed side effects and warnings of medication with patient and wife.  Recheck 6 weeks     Orders:    escitalopram (LEXAPRO) 10 mg tablet; Take 1 tablet (10 mg total) by mouth daily    LORazepam (Ativan) 0.5 mg tablet; Take 1 tablet (0.5 mg total) by mouth every 8 (eight) hours as needed for anxiety    C7 radiculopathy  Left arm symptoms reproducible with movement-distribution appears to be in the C7 dermatome.  Acute worsening may be related to stress/tension if neck.  Will treat  depression and anxiety.  Recommend moist heat and gentle range of motion for the neck.  Consider physical therapy if not improving in 2 to 3 weeks.       Chest pain, unspecified type  Reviewed ED records.  ECG was normal in this office as well as in the ER.  Troponin was also normal.  Chest discomfort appears to be anxiety related.  Will treat underlying anxiety and depression.  Continue conservative measures.  Recheck 6 weeks-earlier if symptoms should worsen          I have spent a total time of 50 minutes in caring for this patient on the day of the visit/encounter including Diagnostic results, Prognosis, Risks and benefits of tx options, Instructions for management, Patient and family education, Importance of tx compliance, Counseling / Coordination of care, Documenting in the medical record, Reviewing / ordering tests, medicine, procedures  , and Obtaining or reviewing history  .    History of Present Illness     Patient here for follow-up of ED visit as well as several symptoms  -44-year-old male without significant past medical history was seen in this office as an acute emergent visit on 10/8.  At that time, patient presented with difficulty with speech, chest pain radiating to his left arm.  Patient was evaluated and referred to [place] ED by ambulance for further workup.  Fortunately, testing in the ED was negative for acute coronary issues.  Patient was able to be discharged to home.  Patient is here for follow-up  -Patient is here with wife who helps to support history.  Patient noted that he was in normal state of health until approximately 1 month ago with 2 coworkers left his department.  He was offered a promotion but then became worried regarding the responsibility, and time away from his family.  He subsequently developed episodes of chest discomfort radiating to his shoulders, headaches, body aches, fatigue, occasional sleep disturbance, and then eventually episodes of speech difficulty.  He  admits to feeling depressed during this period of time as well as very anxious.  He notes that he drinks 1-3 beers at night and feels better after he has had a drink.  She denies any drug use.  He had an apparent episode of depression in his early 20s.  At that time he was seen by psychiatry and underwent counseling which she did not feel to be beneficial.  He was also started on medication which did not seem to help as much.  Since then, patient has not had any issues with his mood until the present.  PHQ and CITLALI*7 screens done  -Patient does note that he is still having chest discomfort as well as discomfort in his left arm that may not be associated with his chest.  He still goes to the gym at least once a week, I denies any worsening symptoms with exertion.  He does have a little bit of neck discomfort but denies any shoulder, elbow or wrist pains.  He denies any history of neck issues in the past  -I reviewed ED records and lab results.  - I reviewed PMHx, PSHx, Fam Hx and Soc Hx with pt.   - full ROS done        Review of Systems   Constitutional:  Positive for activity change and fatigue. Negative for appetite change, chills and fever.   HENT: Negative.     Eyes: Negative.    Respiratory:  Positive for shortness of breath (occasional).    Cardiovascular:  Positive for chest pain and palpitations. Negative for leg swelling.   Gastrointestinal: Negative.    Genitourinary: Negative.    Musculoskeletal: Negative.    Skin: Negative.    Neurological:  Positive for numbness (and tingling in L arm). Negative for dizziness, weakness, light-headedness and headaches.   Psychiatric/Behavioral:  Positive for decreased concentration, dysphoric mood and sleep disturbance. Negative for behavioral problems, confusion, self-injury and suicidal ideas. The patient is nervous/anxious.      Past Medical History:   Diagnosis Date    Asthma     mild as a child    Multiple lipomas      History reviewed. No pertinent surgical  "history.  Family History   Problem Relation Age of Onset    Thyroid cancer Mother     Prostate cancer Father     Hypertension Father     Prostate cancer Paternal Grandfather      Social History     Tobacco Use    Smoking status: Former     Current packs/day: 0.00     Average packs/day: 1.5 packs/day for 3.0 years (4.5 ttl pk-yrs)     Types: Cigarettes     Start date: 1/10/2006     Quit date: 1/10/2009     Years since quitting: 15.7     Passive exposure: Never    Smokeless tobacco: Former     Quit date: 1/10/2009   Vaping Use    Vaping status: Never Used   Substance and Sexual Activity    Alcohol use: Yes     Alcohol/week: 28.0 standard drinks of alcohol     Types: 28 Cans of beer per week    Drug use: No    Sexual activity: Yes     Partners: Female     Current Outpatient Medications on File Prior to Visit   Medication Sig    cetirizine (ZyrTEC) 10 mg tablet Take 10 mg by mouth daily    multivitamin (THERAGRAN) TABS Take by mouth daily     Allergies   Allergen Reactions    Bee Venom Edema     Immunization History   Administered Date(s) Administered    COVID-19 MODERNA VACC 0.5 ML IM 04/19/2021, 05/21/2021    COVID-19 PFIZER VACCINE 0.3 ML IM 12/03/2021    INFLUENZA 10/06/2018, 10/09/2019, 10/18/2021    Influenza, injectable, quadrivalent, preservative free 0.5 mL 10/09/2019, 09/26/2023    Tdap 09/01/2023     Objective     /90 (BP Location: Left arm, Patient Position: Sitting, Cuff Size: Adult)   Pulse 94   Temp 97.8 °F (36.6 °C)   Ht 6' 1\" (1.854 m)   Wt 83.8 kg (184 lb 12.8 oz)   SpO2 98%   BMI 24.38 kg/m²     Physical Exam  Vitals reviewed.   HENT:      Head: Normocephalic.      Right Ear: Tympanic membrane, ear canal and external ear normal.      Left Ear: Tympanic membrane, ear canal and external ear normal.      Nose: Nose normal.      Mouth/Throat:      Mouth: Mucous membranes are moist.      Pharynx: No oropharyngeal exudate.   Eyes:      Extraocular Movements: Extraocular movements intact.      " Conjunctiva/sclera: Conjunctivae normal.      Pupils: Pupils are equal, round, and reactive to light.   Neck:      Vascular: No carotid bruit.      Comments: Left arm tingling in the C7 distribution with posterior flexion of the neck.  Worse with left rotation.  Cardiovascular:      Rate and Rhythm: Normal rate and regular rhythm.      Pulses: Normal pulses.   Pulmonary:      Effort: Pulmonary effort is normal.   Abdominal:      General: There is no distension.      Palpations: There is no mass.      Tenderness: There is no abdominal tenderness.   Musculoskeletal:         General: No swelling, tenderness or deformity.      Cervical back: No tenderness.      Right lower leg: No edema.      Left lower leg: No edema.   Lymphadenopathy:      Cervical: No cervical adenopathy.   Skin:     General: Skin is warm.      Capillary Refill: Capillary refill takes less than 2 seconds.   Neurological:      General: No focal deficit present.      Mental Status: He is alert.      Cranial Nerves: No cranial nerve deficit.      Sensory: No sensory deficit.      Motor: No weakness.      Coordination: Coordination normal.      Gait: Gait normal.      Deep Tendon Reflexes: Reflexes normal.   Psychiatric:         Behavior: Behavior normal.         Thought Content: Thought content normal.         Judgment: Judgment normal.      Comments: PHQ-2/9 Depression Screening    Little interest or pleasure in doing things: 2 - more than half the days  Feeling down, depressed, or hopeless: 2 - more than half the days  Trouble falling or staying asleep, or sleeping too much: 0 - not at all  Feeling tired or having little energy: 2 - more than half the days  Poor appetite or overeatin - not at all  Feeling bad about yourself - or that you are a failure or have let   yourself or your family down: 2 - more than half the days  Trouble concentrating on things, such as reading the newspaper or watching   television: 3 - nearly every day  Moving or  speaking so slowly that other people could have noticed. Or the   opposite - being so fidgety or restless that you have been moving around a   lot more than usual: 2 - more than half the days  Thoughts that you would be better off dead, or of hurting yourself in some   way: 0 - not at all  PHQ-2 Score: 4  PHQ-2 Interpretation: POSITIVE depression screen  PHQ-9 Score: 13  PHQ-9 Interpretation: Moderate depression     CITLALI-7 Flowsheet Screening    Flowsheet Row Most Recent Value   Over the last two weeks, how often have you been bothered by the following   problems?     Feeling nervous, anxious, or on edge 2   Not being able to stop or control worrying 2   Worrying too much about different things 2   Trouble relaxing  0   Being so restless that it's hard to sit still 2   Becoming easily annoyed or irritable  2   Feeling afraid as if something awful might happen 2   How difficult have these problems made it for you to do your work, take   care of things at home, or get along with other people?  Very difficult   CITLALI Score  12

## 2024-10-11 NOTE — ASSESSMENT & PLAN NOTE
Depression Screening Follow-up Plan: Patient's depression screening was positive with a PHQ-2 score of 4. Their PHQ-9 score was 13.   I reviewed all with patient and wife.  Appears to have a recurrence of previous episode of depression.  Seems to also have a lot of anxiety accompanying this.  I reviewed labs from the ER which were generally unremarkable.  Will refer patient for counseling (number for [facility] given).  Start Lexapro 10 mg p.o. daily.  May use lorazepam 0.5 mg, 1/2 to 1 tablet every 8 hours as needed for significant anxiety.  I reviewed side effects of medications with patient and wife.  Recheck 6 weeks.  Orders:    escitalopram (LEXAPRO) 10 mg tablet; Take 1 tablet (10 mg total) by mouth daily    LORazepam (Ativan) 0.5 mg tablet; Take 1 tablet (0.5 mg total) by mouth every 8 (eight) hours as needed for anxiety

## 2024-11-11 ENCOUNTER — RA CDI HCC (OUTPATIENT)
Dept: OTHER | Facility: HOSPITAL | Age: 44
End: 2024-11-11

## 2024-11-26 ENCOUNTER — OFFICE VISIT (OUTPATIENT)
Dept: FAMILY MEDICINE CLINIC | Facility: CLINIC | Age: 44
End: 2024-11-26
Payer: COMMERCIAL

## 2024-11-26 VITALS
HEART RATE: 64 BPM | SYSTOLIC BLOOD PRESSURE: 134 MMHG | OXYGEN SATURATION: 98 % | BODY MASS INDEX: 24.6 KG/M2 | DIASTOLIC BLOOD PRESSURE: 90 MMHG | HEIGHT: 73 IN | WEIGHT: 185.6 LBS | TEMPERATURE: 97.4 F

## 2024-11-26 DIAGNOSIS — F33.0 MILD EPISODE OF RECURRENT MAJOR DEPRESSIVE DISORDER (HCC): ICD-10-CM

## 2024-11-26 DIAGNOSIS — F41.9 ANXIETY: ICD-10-CM

## 2024-11-26 PROCEDURE — 99214 OFFICE O/P EST MOD 30 MIN: CPT | Performed by: FAMILY MEDICINE

## 2024-11-26 NOTE — PROGRESS NOTES
Name: Lyle Dorado      : 1980      MRN: [id number]  Encounter Provider: Vamshi Little MD  Encounter Date: 2024   Encounter department: [facility]    Assessment & Plan  Mild episode of recurrent major depressive disorder (HCC)  Depression Screening Follow-up Plan: Patient's depression screening was positive with a PHQ-9 score of 7, with PHQ-2 = 2.    I reviewed with pt. Continue Lexapro as directed.  Monitor.  Recheck 3m         Anxiety  CITLALI-7=5, imporved from last visit.  Continue Lexapro. Monitor.  Recheck 3m - earlier if worse            History of Present Illness     f/u multiple med issues  - pt doing well.  Tolerating meds well. Mood better. He denies side effects to meds.  PHQ and CITLALI done  - no new CV, resp, GI or  complaints      Review of Systems   Constitutional: Negative.    HENT: Negative.     Eyes: Negative.    Respiratory: Negative.     Cardiovascular: Negative.    Gastrointestinal: Negative.    Musculoskeletal: Negative.    Neurological: Negative.    Psychiatric/Behavioral:  Positive for dysphoric mood. Negative for decreased concentration and suicidal ideas. The patient is not nervous/anxious.      Past Medical History:   Diagnosis Date    Asthma     mild as a child    Multiple lipomas      History reviewed. No pertinent surgical history.  Family History   Problem Relation Age of Onset    Thyroid cancer Mother     Prostate cancer Father     Hypertension Father     Prostate cancer Paternal Grandfather      Social History     Tobacco Use    Smoking status: Former     Current packs/day: 0.00     Average packs/day: 1.5 packs/day for 3.0 years (4.5 ttl pk-yrs)     Types: Cigarettes     Start date: 1/10/2006     Quit date: 1/10/2009     Years since quitting: 15.8     Passive exposure: Never    Smokeless tobacco: Former     Quit date: 1/10/2009   Vaping Use    Vaping status: Never Used   Substance and Sexual Activity    Alcohol use: Yes     Alcohol/week:  "28.0 standard drinks of alcohol     Types: 28 Cans of beer per week    Drug use: No    Sexual activity: Yes     Partners: Female     Current Outpatient Medications on File Prior to Visit   Medication Sig    cetirizine (ZyrTEC) 10 mg tablet Take 10 mg by mouth daily    escitalopram (LEXAPRO) 10 mg tablet Take 1 tablet (10 mg total) by mouth daily    LORazepam (Ativan) 0.5 mg tablet Take 1 tablet (0.5 mg total) by mouth every 8 (eight) hours as needed for anxiety    multivitamin (THERAGRAN) TABS Take by mouth daily     Allergies   Allergen Reactions    Bee Venom Edema     Immunization History   Administered Date(s) Administered    COVID-19 MODERNA VACC 0.5 ML IM 04/19/2021, 05/21/2021    COVID-19 PFIZER VACCINE 0.3 ML IM 12/03/2021    INFLUENZA 10/06/2018, 10/09/2019, 10/18/2021    Influenza, injectable, quadrivalent, preservative free 0.5 mL 10/09/2019, 09/26/2023    Influenza, seasonal, injectable, preservative free 10/11/2024    Tdap 09/01/2023     Objective   /90   Pulse 64   Temp (!) 97.4 °F (36.3 °C)   Ht 6' 1\" (1.854 m)   Wt 84.2 kg (185 lb 9.6 oz)   SpO2 98%   BMI 24.49 kg/m²     Physical Exam  Vitals reviewed.   HENT:      Head: Normocephalic.      Mouth/Throat:      Mouth: Mucous membranes are moist.   Eyes:      Extraocular Movements: Extraocular movements intact.      Conjunctiva/sclera: Conjunctivae normal.      Pupils: Pupils are equal, round, and reactive to light.   Cardiovascular:      Rate and Rhythm: Normal rate and regular rhythm.      Pulses: Normal pulses.   Pulmonary:      Effort: Pulmonary effort is normal.   Abdominal:      General: There is no distension.      Palpations: There is no mass.      Tenderness: There is no abdominal tenderness.   Musculoskeletal:      Cervical back: No tenderness.      Right lower leg: No edema.      Left lower leg: No edema.   Lymphadenopathy:      Cervical: No cervical adenopathy.   Skin:     General: Skin is warm.   Neurological:      General: No " focal deficit present.      Mental Status: He is alert and oriented to person, place, and time.   Psychiatric:         Behavior: Behavior normal.         Thought Content: Thought content normal.         Judgment: Judgment normal.      Comments: PHQ-2/9 Depression Screening    Little interest or pleasure in doing things: 1 - several days  Feeling down, depressed, or hopeless: 1 - several days  Trouble falling or staying asleep, or sleeping too much: 0 - not at all  Feeling tired or having little energy: 2 - more than half the days  Poor appetite or overeatin - not at all  Feeling bad about yourself - or that you are a failure or have let   yourself or your family down: 1 - several days  Trouble concentrating on things, such as reading the newspaper or watching   television: 2 - more than half the days  Moving or speaking so slowly that other people could have noticed. Or the   opposite - being so fidgety or restless that you have been moving around a   lot more than usual: 0 - not at all  Thoughts that you would be better off dead, or of hurting yourself in some   way: 0 - not at all  PHQ-9 Score: 7  PHQ-9 Interpretation: Mild depression     CITLALI-7 Flowsheet Screening    Flowsheet Row Most Recent Value   Over the last two weeks, how often have you been bothered by the following   problems?     Feeling nervous, anxious, or on edge 1   Not being able to stop or control worrying 1   Worrying too much about different things 1   Trouble relaxing  0   Being so restless that it's hard to sit still 1   Becoming easily annoyed or irritable  1   Feeling afraid as if something awful might happen 0   How difficult have these problems made it for you to do your work, take   care of things at home, or get along with other people?  Somewhat   difficult   CITLALI Score  5

## 2024-11-29 PROBLEM — F33.0 MILD EPISODE OF RECURRENT MAJOR DEPRESSIVE DISORDER (HCC): Status: ACTIVE | Noted: 2024-10-11

## 2024-11-29 NOTE — ASSESSMENT & PLAN NOTE
CITLALI-7=5, imporved from last visit.  Continue Lexapro. Monitor.  Recheck 3m - earlier if worse

## 2024-11-29 NOTE — ASSESSMENT & PLAN NOTE
Depression Screening Follow-up Plan: Patient's depression screening was positive with a PHQ-9 score of 7, with PHQ-2 = 2.   I reviewed with pt. Continue Lexapro as directed.  Monitor.  Recheck 3m

## 2025-01-09 ENCOUNTER — OFFICE VISIT (OUTPATIENT)
Dept: FAMILY MEDICINE CLINIC | Facility: CLINIC | Age: 45
End: 2025-01-09
Payer: COMMERCIAL

## 2025-01-09 VITALS
HEART RATE: 56 BPM | BODY MASS INDEX: 24.84 KG/M2 | HEIGHT: 73 IN | DIASTOLIC BLOOD PRESSURE: 78 MMHG | TEMPERATURE: 97.1 F | OXYGEN SATURATION: 98 % | WEIGHT: 187.4 LBS | SYSTOLIC BLOOD PRESSURE: 124 MMHG

## 2025-01-09 DIAGNOSIS — Z00.00 ANNUAL PHYSICAL EXAM: Primary | ICD-10-CM

## 2025-01-09 DIAGNOSIS — F41.9 ANXIETY: ICD-10-CM

## 2025-01-09 DIAGNOSIS — F33.1 MODERATE EPISODE OF RECURRENT MAJOR DEPRESSIVE DISORDER (HCC): ICD-10-CM

## 2025-01-09 PROCEDURE — 99396 PREV VISIT EST AGE 40-64: CPT | Performed by: FAMILY MEDICINE

## 2025-01-09 PROCEDURE — 99214 OFFICE O/P EST MOD 30 MIN: CPT | Performed by: FAMILY MEDICINE

## 2025-01-09 RX ORDER — ESCITALOPRAM OXALATE 10 MG/1
15 TABLET ORAL DAILY
Start: 2025-01-09 | End: 2025-10-06

## 2025-01-09 NOTE — ASSESSMENT & PLAN NOTE
Anxiety better but still not at goal.  IncreaseLexapro to 15mg qd.  Recheck 3m - earlier if not at goal  Orders:  •  escitalopram (LEXAPRO) 10 mg tablet; Take 1.5 tablets (15 mg total) by mouth daily

## 2025-01-09 NOTE — PROGRESS NOTES
Adult Annual Physical  Name: Lyle Dorado      : 1980      MRN: [id number]  Encounter Provider: Vamshi Little MD  Encounter Date: 2025   Encounter department: [facility]    Assessment & Plan  Annual physical exam         Moderate episode of recurrent major depressive disorder (HCC)  Depression Screening Follow-up Plan: Patient's depression screening was positive with a PHQ-9 score of 5.  Depression better but still not at goal.  IncreaseLexapro to 15mg qd.  Recheck 3m - earlier if not at goal    Orders:  •  escitalopram (LEXAPRO) 10 mg tablet; Take 1.5 tablets (15 mg total) by mouth daily    Anxiety  Anxiety better but still not at goal.  IncreaseLexapro to 15mg qd.  Recheck 3m - earlier if not at goal  Orders:  •  escitalopram (LEXAPRO) 10 mg tablet; Take 1.5 tablets (15 mg total) by mouth daily    Immunizations and preventive care screenings were discussed with patient today. Appropriate education was printed on patient's after visit summary.    Discussed risks and benefits of prostate cancer screening. We discussed the controversial history of PSA screening for prostate cancer in the [place] as well as the risk of over detection and over treatment of prostate cancer by way of PSA screening.  The patient understands that PSA blood testing is an imperfect way to screen for prostate cancer and that elevated PSA levels in the blood may also be caused by infection, inflammation, prostatic trauma or manipulation, urological procedures, or by benign prostatic enlargement.    The role of the digital rectal examination in prostate cancer screening was also discussed and I discussed with him that there is large interobserver variability in the findings of digital rectal examination.    Counseling:  Exercise: the importance of regular exercise/physical activity was discussed. Recommend exercise 3-5 times per week for at least 30 minutes.          History of Present  Illness     Adult Annual Physical:  Patient presents for annual physical. - Patient notes that mood may be a little bit worse over the last few weeks.  Patient initially had a good response to Lexapro 10 mg a day with decrease in depression and anxiety.  He denies any worsening stressors, or other worsening triggers.  Patient is interested in increasing dose if possible.  - Patient notes that since starting Lexapro, he does not feel any effects from alcohol.  Typically patient would drink a couple of beers a night to help relax but finds that this is no longer effective.  Patient states that he is cut back on his drinking because of this and is looking to stop totally.  -Patient continues to follow-up with his chiropractor.  Through those treatments, he notes that the tingling in his left hand has resolved..     Diet and Physical Activity:  - Diet/Nutrition: well balanced diet.  - Exercise: 1-2 times a week on average and 30-60 minutes on average. goes to climbing gym    Depression Screening:    - PHQ-9 Score: 5    General Health:  - Sleep: sleeps well and 7-8 hours of sleep on average.  - Hearing: normal hearing bilateral ears.  - Vision: no vision problems.  - Dental: regular dental visits and brushes teeth twice daily.     Health:  - History of STDs: no.   - Urinary symptoms: none.     Advanced Care Planning:  - Has an advanced directive?: no    - Has a durable medical POA?: no      Review of Systems   Constitutional: Negative.    HENT: Negative.     Eyes: Negative.    Respiratory: Negative.     Cardiovascular: Negative.    Gastrointestinal: Negative.    Endocrine: Negative.    Genitourinary: Negative.    Musculoskeletal: Negative.    Skin: Negative.    Allergic/Immunologic: Negative.    Neurological: Negative.    Hematological: Negative.    Psychiatric/Behavioral:  Positive for dysphoric mood (improved but not resolved). Negative for sleep disturbance. The patient is nervous/anxious (improved but not  resolved).      Past Medical History   Past Medical History:   Diagnosis Date   • Asthma     mild as a child   • Multiple lipomas      History reviewed. No pertinent surgical history.  Family History   Problem Relation Age of Onset   • Thyroid cancer Mother    • Prostate cancer Father    • Hypertension Father    • Prostate cancer Paternal Grandfather       reports that he quit smoking about 16 years ago. His smoking use included cigarettes. He started smoking about 19 years ago. He has a 4.5 pack-year smoking history. He has never been exposed to tobacco smoke. He quit smokeless tobacco use about 16 years ago. He reports current alcohol use of about 24.0 standard drinks of alcohol per week. He reports that he does not use drugs.  Current Outpatient Medications on File Prior to Visit   Medication Sig Dispense Refill   • cetirizine (ZyrTEC) 10 mg tablet Take 10 mg by mouth daily     • LORazepam (Ativan) 0.5 mg tablet Take 1 tablet (0.5 mg total) by mouth every 8 (eight) hours as needed for anxiety 15 tablet 0   • multivitamin (THERAGRAN) TABS Take by mouth daily       No current facility-administered medications on file prior to visit.     Allergies   Allergen Reactions   • Bee Venom Edema      Current Outpatient Medications on File Prior to Visit   Medication Sig Dispense Refill   • cetirizine (ZyrTEC) 10 mg tablet Take 10 mg by mouth daily     • LORazepam (Ativan) 0.5 mg tablet Take 1 tablet (0.5 mg total) by mouth every 8 (eight) hours as needed for anxiety 15 tablet 0   • multivitamin (THERAGRAN) TABS Take by mouth daily       No current facility-administered medications on file prior to visit.      Social History     Tobacco Use   • Smoking status: Former     Current packs/day: 0.00     Average packs/day: 1.5 packs/day for 3.0 years (4.5 ttl pk-yrs)     Types: Cigarettes     Start date: 1/10/2006     Quit date: 1/10/2009     Years since quittin.0     Passive exposure: Never   • Smokeless tobacco: Former      "Quit date: 1/10/2009   Vaping Use   • Vaping status: Never Used   Substance and Sexual Activity   • Alcohol use: Yes     Alcohol/week: 24.0 standard drinks of alcohol     Types: 24 Cans of beer per week   • Drug use: No   • Sexual activity: Yes     Partners: Female     Birth control/protection: Condom Male       Objective   /78   Pulse 56   Temp (!) 97.1 °F (36.2 °C)   Ht 6' 1\" (1.854 m)   Wt 85 kg (187 lb 6.4 oz)   SpO2 98%   BMI 24.72 kg/m²     Physical Exam  Vitals reviewed.   Constitutional:       Appearance: He is well-developed.   HENT:      Head: Normocephalic and atraumatic.      Right Ear: Tympanic membrane, ear canal and external ear normal.      Left Ear: Tympanic membrane, ear canal and external ear normal.      Nose: Nose normal.      Mouth/Throat:      Mouth: Mucous membranes are moist.   Eyes:      Extraocular Movements: Extraocular movements intact.      Conjunctiva/sclera: Conjunctivae normal.      Pupils: Pupils are equal, round, and reactive to light.   Neck:      Thyroid: No thyromegaly.      Vascular: No JVD.   Cardiovascular:      Rate and Rhythm: Normal rate and regular rhythm.      Pulses: Normal pulses.      Heart sounds: Normal heart sounds. No murmur heard.  Pulmonary:      Effort: Pulmonary effort is normal. No respiratory distress.      Breath sounds: Normal breath sounds. No wheezing or rales.   Abdominal:      General: Bowel sounds are normal. There is no distension.      Palpations: Abdomen is soft. There is no mass.      Tenderness: There is no abdominal tenderness.   Musculoskeletal:         General: No swelling, tenderness or deformity. Normal range of motion.      Cervical back: Normal range of motion and neck supple. No tenderness. No muscular tenderness.      Right lower leg: No edema.      Left lower leg: No edema.   Lymphadenopathy:      Cervical: No cervical adenopathy.   Skin:     General: Skin is warm.      Capillary Refill: Capillary refill takes less than 2 " seconds.   Neurological:      Mental Status: He is alert and oriented to person, place, and time.      Cranial Nerves: No cranial nerve deficit.      Sensory: No sensory deficit.      Motor: No weakness or abnormal muscle tone.      Coordination: Coordination normal.      Gait: Gait normal.      Deep Tendon Reflexes: Reflexes normal.   Psychiatric:         Behavior: Behavior normal.         Thought Content: Thought content normal.         Judgment: Judgment normal.      Comments: PHQ-2/9 Depression Screening    Little interest or pleasure in doing things: 0 - not at all  Feeling down, depressed, or hopeless: 1 - several days  Trouble falling or staying asleep, or sleeping too much: 0 - not at all  Feeling tired or having little energy: 1 - several days  Poor appetite or overeatin - not at all  Feeling bad about yourself - or that you are a failure or have let   yourself or your family down: 1 - several days  Trouble concentrating on things, such as reading the newspaper or watching   television: 1 - several days  Moving or speaking so slowly that other people could have noticed. Or the   opposite - being so fidgety or restless that you have been moving around a   lot more than usual: 1 - several days  Thoughts that you would be better off dead, or of hurting yourself in some   way: 0 - not at all  PHQ-9 Score: 5  PHQ-9 Interpretation: Mild depression     Still with occ episode of anxiety.  Improved but not at goal

## 2025-02-18 DIAGNOSIS — F33.1 MODERATE EPISODE OF RECURRENT MAJOR DEPRESSIVE DISORDER (HCC): ICD-10-CM

## 2025-02-18 DIAGNOSIS — F41.9 ANXIETY: ICD-10-CM

## 2025-02-19 RX ORDER — ESCITALOPRAM OXALATE 10 MG/1
15 TABLET ORAL DAILY
Qty: 45 TABLET | Refills: 0 | Status: SHIPPED | OUTPATIENT
Start: 2025-02-19 | End: 2025-11-16

## 2025-03-14 DIAGNOSIS — F33.1 MODERATE EPISODE OF RECURRENT MAJOR DEPRESSIVE DISORDER (HCC): ICD-10-CM

## 2025-03-14 DIAGNOSIS — F41.9 ANXIETY: ICD-10-CM

## 2025-03-14 RX ORDER — ESCITALOPRAM OXALATE 10 MG/1
TABLET ORAL
Qty: 135 TABLET | Refills: 1 | Status: SHIPPED | OUTPATIENT
Start: 2025-03-14

## 2025-04-23 ENCOUNTER — OFFICE VISIT (OUTPATIENT)
Dept: FAMILY MEDICINE CLINIC | Facility: CLINIC | Age: 45
End: 2025-04-23
Payer: COMMERCIAL

## 2025-04-23 VITALS
HEART RATE: 76 BPM | OXYGEN SATURATION: 98 % | BODY MASS INDEX: 23.99 KG/M2 | TEMPERATURE: 97.3 F | WEIGHT: 181 LBS | SYSTOLIC BLOOD PRESSURE: 124 MMHG | HEIGHT: 73 IN | DIASTOLIC BLOOD PRESSURE: 70 MMHG

## 2025-04-23 DIAGNOSIS — F33.0 MILD EPISODE OF RECURRENT MAJOR DEPRESSIVE DISORDER (HCC): Primary | ICD-10-CM

## 2025-04-23 DIAGNOSIS — F41.9 ANXIETY: ICD-10-CM

## 2025-04-23 PROCEDURE — 99214 OFFICE O/P EST MOD 30 MIN: CPT | Performed by: FAMILY MEDICINE

## 2025-04-23 RX ORDER — LORAZEPAM 0.5 MG/1
0.5 TABLET ORAL EVERY 8 HOURS PRN
Qty: 15 TABLET | Refills: 0 | Status: SHIPPED | OUTPATIENT
Start: 2025-04-23

## 2025-04-23 NOTE — ASSESSMENT & PLAN NOTE
Depression Screening Follow-up Plan: Patient's depression screening was positive with a PHQ-9 score of 7.

## 2025-04-23 NOTE — PROGRESS NOTES
Name: Lyle Dorado      : 1980      MRN: [id number]  Encounter Provider: Vamshi Little MD  Encounter Date: 2025   Encounter department: [facility]    Assessment & Plan  Mild episode of recurrent major depressive disorder (HCC)  Depression Screening Follow-up Plan: Patient's depression screening was positive with a PHQ-9 score of 7. Patient with underlying depression and was advised to continue current medications as prescribed.  Orders:  •  LORazepam (Ativan) 0.5 mg tablet; Take 1 tablet (0.5 mg total) by mouth every 8 (eight) hours as needed for anxiety    Anxiety  CITLALI-7 = 4.  Pt doing well on present treatment.  Refilled lorazepam prn.  Recheck 6m  Orders:  •  LORazepam (Ativan) 0.5 mg tablet; Take 1 tablet (0.5 mg total) by mouth every 8 (eight) hours as needed for anxiety         History of Present Illness     f/u multiple med issues  - pt doing well.   - continues to do well with mood. May have 1-2 bad days (increased anxiety/depression) every 2w.  No specific trigger noted. Tolerating meds well. Presently down to needing 1 Ativan a month  - pt denies any new CV, resp, GI or  complaints      Review of Systems   Constitutional: Negative.    HENT: Negative.     Eyes: Negative.    Respiratory: Negative.     Cardiovascular: Negative.    Gastrointestinal: Negative.    Endocrine: Negative.    Genitourinary: Negative.    Musculoskeletal: Negative.    Skin: Negative.    Allergic/Immunologic: Negative.    Neurological: Negative.    Hematological: Negative.    Psychiatric/Behavioral:  Positive for dysphoric mood (occasional). The patient is nervous/anxious (occasional).      Past Medical History:   Diagnosis Date   • Asthma     mild as a child   • Multiple lipomas      History reviewed. No pertinent surgical history.  Family History   Problem Relation Age of Onset   • Thyroid cancer Mother    • Prostate cancer Father    • Hypertension Father    • Prostate cancer  "Paternal Grandfather      Social History     Tobacco Use   • Smoking status: Former     Current packs/day: 0.00     Average packs/day: 1.5 packs/day for 3.0 years (4.5 ttl pk-yrs)     Types: Cigarettes     Start date: 1/10/2006     Quit date: 1/10/2009     Years since quittin.3     Passive exposure: Never   • Smokeless tobacco: Former     Quit date: 1/10/2009   Vaping Use   • Vaping status: Never Used   Substance and Sexual Activity   • Alcohol use: Yes     Alcohol/week: 24.0 standard drinks of alcohol     Types: 24 Cans of beer per week   • Drug use: No   • Sexual activity: Yes     Partners: Female     Birth control/protection: Condom Male     Current Outpatient Medications on File Prior to Visit   Medication Sig   • cetirizine (ZyrTEC) 10 mg tablet Take 10 mg by mouth daily   • escitalopram (LEXAPRO) 10 mg tablet TAKE 1 AND 1/2 TABLETS DAILY BY MOUTH   • multivitamin (THERAGRAN) TABS Take by mouth daily     Allergies   Allergen Reactions   • Bee Venom Edema     Immunization History   Administered Date(s) Administered   • COVID-19 MODERNA VACC 0.5 ML IM 2021, 2021   • COVID-19 PFIZER VACCINE 0.3 ML IM 2021   • INFLUENZA 10/06/2018, 10/09/2019, 10/18/2021   • Influenza, injectable, quadrivalent, preservative free 0.5 mL 10/09/2019, 2023   • Influenza, seasonal, injectable, preservative free 10/11/2024   • Tdap 2023     Objective   /70   Pulse 76   Temp (!) 97.3 °F (36.3 °C)   Ht 6' 1\" (1.854 m)   Wt 82.1 kg (181 lb)   SpO2 98%   BMI 23.88 kg/m²     Physical Exam  Vitals reviewed.   Constitutional:       Appearance: Normal appearance.   HENT:      Head: Normocephalic.      Mouth/Throat:      Mouth: Mucous membranes are moist.   Eyes:      Extraocular Movements: Extraocular movements intact.      Conjunctiva/sclera: Conjunctivae normal.      Pupils: Pupils are equal, round, and reactive to light.   Cardiovascular:      Rate and Rhythm: Normal rate and regular rhythm.      " Pulses: Normal pulses.   Pulmonary:      Effort: Pulmonary effort is normal.      Breath sounds: Normal breath sounds.   Abdominal:      General: There is no distension.      Palpations: There is no mass.      Tenderness: There is no abdominal tenderness.   Musculoskeletal:         General: Normal range of motion.      Cervical back: No tenderness.      Right lower leg: No edema.      Left lower leg: No edema.   Lymphadenopathy:      Cervical: No cervical adenopathy.   Skin:     General: Skin is warm.      Capillary Refill: Capillary refill takes less than 2 seconds.   Neurological:      General: No focal deficit present.      Mental Status: He is alert and oriented to person, place, and time.   Psychiatric:         Behavior: Behavior normal.         Thought Content: Thought content normal.         Judgment: Judgment normal.      Comments: PHQ-2/9 Depression Screening    Little interest or pleasure in doing things: 1 - several days  Feeling down, depressed, or hopeless: 1 - several days  Trouble falling or staying asleep, or sleeping too much: 0 - not at all  Feeling tired or having little energy: 2 - more than half the days  Poor appetite or overeatin - not at all  Feeling bad about yourself - or that you are a failure or have let   yourself or your family down: 1 - several days  Trouble concentrating on things, such as reading the newspaper or watching   television: 2 - more than half the days  Moving or speaking so slowly that other people could have noticed. Or the   opposite - being so fidgety or restless that you have been moving around a   lot more than usual: 0 - not at all  Thoughts that you would be better off dead, or of hurting yourself in some   way: 0 - not at all  PHQ-9 Score: 7  PHQ-9 Interpretation: Mild depression     CITLALI-7 Flowsheet Screening    Flowsheet Row Most Recent Value   Over the last two weeks, how often have you been bothered by the following   problems?     Feeling nervous, anxious,  or on edge 1   Not being able to stop or control worrying 1   Worrying too much about different things 1   Trouble relaxing  0   Being so restless that it's hard to sit still 1   Becoming easily annoyed or irritable  0   Feeling afraid as if something awful might happen 0   How difficult have these problems made it for you to do your work, take   care of things at home, or get along with other people?  Somewhat   difficult   CITLALI Score  4

## 2025-04-23 NOTE — ASSESSMENT & PLAN NOTE
CITLALI-7 = 4.  Pt doing well on present treatment.  Refilled lorazepam prn.  Recheck 6m  Orders:  •  LORazepam (Ativan) 0.5 mg tablet; Take 1 tablet (0.5 mg total) by mouth every 8 (eight) hours as needed for anxiety

## 2025-04-29 NOTE — ASSESSMENT & PLAN NOTE
Depression Screening Follow-up Plan: Patient's depression screening was positive with a PHQ-9 score of 7. Patient with underlying depression and was advised to continue current medications as prescribed.  Orders:  •  LORazepam (Ativan) 0.5 mg tablet; Take 1 tablet (0.5 mg total) by mouth every 8 (eight) hours as needed for anxiety